# Patient Record
Sex: FEMALE | Race: OTHER | NOT HISPANIC OR LATINO | Employment: PART TIME
[De-identification: names, ages, dates, MRNs, and addresses within clinical notes are randomized per-mention and may not be internally consistent; named-entity substitution may affect disease eponyms.]

---

## 2022-08-31 ENCOUNTER — TELEMEDICINE (OUTPATIENT)
Dept: INTERNAL MEDICINE | Facility: OTHER | Age: 32
End: 2022-08-31
Payer: COMMERCIAL

## 2022-08-31 VITALS — BODY MASS INDEX: 17.73 KG/M2 | WEIGHT: 117 LBS | HEIGHT: 68 IN

## 2022-08-31 DIAGNOSIS — Z30.431 ENCOUNTER FOR MANAGEMENT OF INTRAUTERINE CONTRACEPTIVE DEVICE (IUD), UNSPECIFIED IUD MANAGEMENT TYPE: ICD-10-CM

## 2022-08-31 DIAGNOSIS — C50.911 INFILTRATING DUCTAL CARCINOMA OF RIGHT BREAST (HCC): ICD-10-CM

## 2022-08-31 DIAGNOSIS — F90.9 ATTENTION DEFICIT HYPERACTIVITY DISORDER (ADHD), UNSPECIFIED ADHD TYPE: ICD-10-CM

## 2022-08-31 DIAGNOSIS — G43.709 CHRONIC MIGRAINE WITHOUT AURA WITHOUT STATUS MIGRAINOSUS, NOT INTRACTABLE: ICD-10-CM

## 2022-08-31 PROBLEM — M20.11 ACQUIRED HALLUX VALGUS OF RIGHT FOOT: Status: ACTIVE | Noted: 2019-09-11

## 2022-08-31 PROBLEM — J34.2 DEVIATED NASAL SEPTUM: Status: ACTIVE | Noted: 2019-02-14

## 2022-08-31 PROBLEM — N39.0 RECURRENT UTI: Status: ACTIVE | Noted: 2018-02-08

## 2022-08-31 PROBLEM — G89.29 CHRONIC PAIN: Status: ACTIVE | Noted: 2021-04-02

## 2022-08-31 PROBLEM — M20.12 ACQUIRED HALLUX VALGUS OF LEFT FOOT: Status: ACTIVE | Noted: 2020-02-25

## 2022-08-31 PROBLEM — K51.50 CHRONIC LEFT-SIDED ULCERATIVE COLITIS (HCC): Status: ACTIVE | Noted: 2022-06-07

## 2022-08-31 PROBLEM — N76.1 CHRONIC VAGINITIS: Status: ACTIVE | Noted: 2018-01-17

## 2022-08-31 PROBLEM — F41.9 ANXIETY DISORDER: Status: ACTIVE | Noted: 2017-12-28

## 2022-08-31 PROBLEM — K51.90 ULCERATIVE COLITIS IN REMISSION (HCC): Status: ACTIVE | Noted: 2020-06-03

## 2022-08-31 PROBLEM — D50.9 IRON DEFICIENCY ANEMIA: Status: ACTIVE | Noted: 2017-12-29

## 2022-08-31 PROBLEM — G47.00 INSOMNIA: Status: ACTIVE | Noted: 2021-04-02

## 2022-08-31 PROBLEM — K52.9 INFLAMMATORY BOWEL DISEASE: Status: ACTIVE | Noted: 2021-03-29

## 2022-08-31 PROBLEM — N87.0 CERVICAL INTRAEPITHELIAL NEOPLASIA GRADE 1: Status: ACTIVE | Noted: 2017-05-03

## 2022-08-31 PROBLEM — C50.411 MALIGNANT NEOPLASM OF UPPER-OUTER QUADRANT OF RIGHT FEMALE BREAST (HCC): Status: ACTIVE | Noted: 2022-07-06

## 2022-08-31 PROBLEM — J34.89 NASAL SEPTAL PERFORATION: Status: ACTIVE | Noted: 2019-02-19

## 2022-08-31 PROBLEM — N20.0 RENAL CALCULUS: Status: ACTIVE | Noted: 2021-04-07

## 2022-08-31 PROBLEM — J30.9 ALLERGIC RHINITIS: Status: ACTIVE | Noted: 2019-02-19

## 2022-08-31 PROBLEM — F33.42 MAJOR DEPRESSIVE DISORDER, RECURRENT EPISODE, IN FULL REMISSION (HCC): Status: ACTIVE | Noted: 2019-02-14

## 2022-08-31 PROBLEM — R87.810 CERVICAL HIGH RISK HUMAN PAPILLOMAVIRUS (HPV) DNA TEST POSITIVE: Status: ACTIVE | Noted: 2017-05-03

## 2022-08-31 PROBLEM — G43.909 MIGRAINE: Status: ACTIVE | Noted: 2021-04-02

## 2022-08-31 PROCEDURE — 99203 OFFICE O/P NEW LOW 30 MIN: CPT | Mod: GC | Performed by: STUDENT IN AN ORGANIZED HEALTH CARE EDUCATION/TRAINING PROGRAM

## 2022-08-31 RX ORDER — ACETAMINOPHEN 500 MG
1-2 TABLET ORAL EVERY 6 HOURS PRN
COMMUNITY
Start: 2021-03-29 | End: 2023-03-29

## 2022-08-31 RX ORDER — DEXTROAMPHETAMINE SACCHARATE, AMPHETAMINE ASPARTATE MONOHYDRATE, DEXTROAMPHETAMINE SULFATE AND AMPHETAMINE SULFATE 5; 5; 5; 5 MG/1; MG/1; MG/1; MG/1
CAPSULE, EXTENDED RELEASE ORAL
COMMUNITY
Start: 2022-07-12 | End: 2023-01-11

## 2022-08-31 ASSESSMENT — PATIENT HEALTH QUESTIONNAIRE - PHQ9
2. FEELING DOWN, DEPRESSED, IRRITABLE, OR HOPELESS: NOT AT ALL
SUM OF ALL RESPONSES TO PHQ9 QUESTIONS 1 AND 2: 0
1. LITTLE INTEREST OR PLEASURE IN DOING THINGS: NOT AT ALL

## 2022-08-31 ASSESSMENT — FIBROSIS 4 INDEX: FIB4 SCORE: 0.77

## 2022-08-31 NOTE — PROGRESS NOTES
Reason to establish: New patient to establish    CC: to establish care and referral for mastectomy    This evaluation was conducted via Zoom using secure and encrypted videoconferencing technology. The patient was in their home in the state of [place].    The patient's identity was confirmed and verbal consent was obtained for this virtual visit.     HPI:   This is 31 yo female with a history of UC on mesalamine, ADHD on adderall (through psychiatry), invasive ductal carcinoma (ER positive, diagnosed in July 2022), migraine on sumatriptan prn who is presenting to establish care and get a referral for mastectomy.    Patient gets her care at [facility] in [place] due to her recently diagnosed breast cancer (invasive ductal carcinoma). She was diagnosed in July 2022.   Will get double mastectomy in a week tentatively according to the patient. Because [facility] is out of network, she needs an out of network referral for her mastectomy through [facility].  [facility] genetic testing results are pending according to the patient.  Sister also has breast cancer    Has had IUD since 8 months ago. Had implant in arm for 4-5 months before that. No contraception before that.  She is currently having period and on her third day. She had her period 10 days ago prior to this one.  Usually her period is regular, occurring every month and lasts 9 days. The last period lasted 14 days. Period amount is light. No change in the severity of cramps or back pain.  She has a history of frequent bruising and heavy bleeding from dental extraction. Otherwise, she did not have significant bleeding history. Her menstruation prior to contraception was never heavy.  She is already scheduled to see ob/gyn this week.    She denies depression or anxiety. She is on adderall for ADHD and her psychiatrist in [place] prescribed it for her according to the patient. PDMP in [place] was reviewed and was clear without prescription of  adderall.    Migraine rarely occurs about once a month. Associated with photophobia and phonophobia. Sumatriptan is good at breaking the migraine.    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 Lives in [place], [place]. Lives with her . Has IUD.   Declined STD screening.    No smoking, alcohol, drugs.    Last pap smear 2019, and was normal.  Last colonoscopy 2020        Patient Active Problem List    Diagnosis Date Noted    Malignant neoplasm of upper-outer quadrant of right female breast ([facility]) 07/06/2022    Chronic left-sided ulcerative colitis (HCC) 06/07/2022    Renal calculus 04/07/2021    Chronic pain 04/02/2021    Insomnia 04/02/2021    Migraine 04/02/2021    Inflammatory bowel disease 03/29/2021    ADHD 09/28/2020    Ulcerative colitis in remission ([facility]) 06/03/2020    Acquired hallux valgus of left foot 02/25/2020    Acquired hallux valgus of right foot 09/11/2019    Allergic rhinitis 02/19/2019    Nasal septal perforation 02/19/2019    Deviated nasal septum 02/14/2019    Major depressive disorder, recurrent episode, in full remission ([facility]) 02/14/2019    Recurrent UTI 02/08/2018    Chronic vaginitis 01/17/2018    Iron deficiency anemia 12/29/2017    Anxiety disorder 12/28/2017    Cervical high risk human papillomavirus (HPV) DNA test positive 05/03/2017    Cervical intraepithelial neoplasia grade 1 05/03/2017       History reviewed. No pertinent past medical history.    Current Outpatient Medications   Medication Sig Dispense Refill    acetaminophen (TYLENOL) 500 MG Tab Take 1-2 Tablets by mouth every 6 hours as needed.      amphetamine-dextroamphetamine (ADDERALL XR) 20 MG per XR capsule Take up to 2 capsules by mouth in the morning for ADHD. I look forward to talking to you in August      Mesalamine (LIALDA PO) Take  by mouth.       No current facility-administered medications for this visit.       Allergies as of 08/31/2022    (No Known Allergies)     No allergies    Social History     Socioeconomic History    Marital  status:      Spouse name: Not on file    Number of children: Not on file    Years of education: Not on file    Highest education level: Not on file   Occupational History    Not on file   Tobacco Use    Smoking status: Never    Smokeless tobacco: Never   Substance and Sexual Activity    Alcohol use: Never    Drug use: Never    Sexual activity: Not on file   Other Topics Concern    Not on file   Social History Narrative    Not on file     Social Determinants of Health     Financial Resource Strain: Not on file   Food Insecurity: Not on file   Transportation Needs: Not on file   Physical Activity: Not on file   Stress: Not on file   Social Connections: Not on file   Intimate Partner Violence: Not on file   Housing Stability: Not on file       History reviewed. No pertinent family history.  FH  Dad-stroke, dementia, diabetes, dyslipidemia, HTN, bypass surgery  Mom-heart attack 38  Paternal uncle- chron's  Paternal cousin-ulcerative colitis     History reviewed. No pertinent surgical history.    PSH  2 buniectomy-R and Left  Rhinoplasty      Allergies  None    ROS: As per HPI. Additional pertinent systems as noted below.  Constitutional: Negative for chills and fever.   HENT: Negative for ear pain and sore throat.    Eyes: Negative for discharge and redness.   Respiratory: Negative for cough, hemoptysis, wheezing and stridor.    Cardiovascular: Negative for chest pain, palpitations and leg swelling.   Gastrointestinal: Negative for abdominal pain, constipation, diarrhea, heartburn, nausea and vomiting.   Genitourinary: Negative for dysuria, flank pain and hematuria.   Musculoskeletal: Negative for falls and myalgias.   Skin: Negative for itching and rash.   Neurological: Negative for dizziness, seizures, loss of consciousness and headaches.   Endo/Heme/Allergies: Negative for polydipsia. Does not bruise/bleed easily.   Psychiatric/Behavioral: Negative for substance abuse and suicidal ideas.       Ht 1.715 m (5'  "7.5\")   Wt 53.1 kg (117 lb)   BMI 18.05 kg/m²     Physical exam limited due to telemedicine.    General: No acute distress.   HEENT: EOM grossly intact, no scleral icterus, no pharyngeal erythema.   Neck:  nontender on patient palpation  CVS: no chest pain, palpitation  Resp: breathing is nonlabored  Abdomen: nontender on patient palpation.  MSK/Ext: No clubbing or cyanosis.  Skin: no rash on face  Neurological: CN III-XII grossly intact. No focal deficits.   Psych: A&O x 3, appropriate affect, good judgement   Note: I have reviewed all pertinent labs and diagnostic tests associated with this visit with specific comments listed under the assessment and plan below    Assessment and Plan  1. Infiltrating ductal carcinoma of right breast (HCC)  Patient gets her care at [facility] in [place] due to her recently diagnosed breast cancer (invasive ductal carcinoma). She was diagnosed in July 2022.   Will get double mastectomy in a week tentatively according to the patient. Because [facility] is out of network, she needs an out of network referral for her mastectomy through [facility].  [facility] genetic testing results are pending according to the patient.  Sister also has breast cancer    Plan:  - Referral to General Surgery    2. Encounter for management of intrauterine contraceptive device (IUD), unspecified IUD management type  Has had IUD since 8 months ago. Had implant in arm for 4-5 months before that. No contraception before that.  She is currently having period and on her third day. She had her period 10 days ago prior to this one.  Usually her period is regular, occurring every month and lasts 9 days. The last period lasted 14 days. Period amount is light. No change in the severity of cramps or back pain.  She has a history of frequent bruising and heavy bleeding from dental extraction. Otherwise, she did not have significant bleeding history. Her menstruation prior to contraception was never heavy.  She " is already scheduled to see ob/gyn this week.    Plan:  Follow up on menstruation after ob/gyn visit    3. Chronic migraine without aura without status migrainosus, not intractable  Migraine rarely occurs about once a month. Associated with photophobia and phonophobia. Sumatriptan is good at breaking the migraine.    Plan:  Sumatriptan prn for migraine    4. ADHD  She denies depression or anxiety. She is on adderall for ADHD and her psychiatrist in [place] prescribed it for her according to the patient. PDMP in [place] was reviewed and was clear without prescription of adderall.    Plan:  -Referral to psychiatry so that patient can continue to get adderall in [place] through psychiatry.    Followup: Return in about 1 month (around 9/30/2022).    Signed by: Cruz Wisdom M.D.

## 2022-08-31 NOTE — PATIENT INSTRUCTIONS
Addended Callie Vogel on: 9/25/2019 09:58 AM     Modules accepted: Trav Follow up in 1-2 months.  -placed referral to surgery at [facility]  -placed referral to psychiatry

## 2022-09-02 PROBLEM — N87.0 CERVICAL INTRAEPITHELIAL NEOPLASIA GRADE 1: Status: RESOLVED | Noted: 2017-05-03 | Resolved: 2022-09-02

## 2022-09-02 PROBLEM — F33.42 MAJOR DEPRESSIVE DISORDER, RECURRENT EPISODE, IN FULL REMISSION (HCC): Status: RESOLVED | Noted: 2019-02-14 | Resolved: 2022-09-02

## 2022-09-02 PROBLEM — F41.9 ANXIETY DISORDER: Status: RESOLVED | Noted: 2017-12-28 | Resolved: 2022-09-02

## 2022-09-02 PROBLEM — G47.00 INSOMNIA: Status: RESOLVED | Noted: 2021-04-02 | Resolved: 2022-09-02

## 2022-09-02 PROBLEM — J34.2 DEVIATED NASAL SEPTUM: Status: RESOLVED | Noted: 2019-02-14 | Resolved: 2022-09-02

## 2022-09-02 PROBLEM — G89.29 CHRONIC PAIN: Status: RESOLVED | Noted: 2021-04-02 | Resolved: 2022-09-02

## 2022-09-02 PROBLEM — J30.9 ALLERGIC RHINITIS: Status: RESOLVED | Noted: 2019-02-19 | Resolved: 2022-09-02

## 2022-09-02 PROBLEM — J34.89 NASAL SEPTAL PERFORATION: Status: RESOLVED | Noted: 2019-02-19 | Resolved: 2022-09-02

## 2022-09-02 PROBLEM — M20.12 ACQUIRED HALLUX VALGUS OF LEFT FOOT: Status: RESOLVED | Noted: 2020-02-25 | Resolved: 2022-09-02

## 2022-09-02 PROBLEM — N39.0 RECURRENT UTI: Status: RESOLVED | Noted: 2018-02-08 | Resolved: 2022-09-02

## 2022-09-02 PROBLEM — D50.9 IRON DEFICIENCY ANEMIA: Status: RESOLVED | Noted: 2017-12-29 | Resolved: 2022-09-02

## 2022-09-02 PROBLEM — R87.810 CERVICAL HIGH RISK HUMAN PAPILLOMAVIRUS (HPV) DNA TEST POSITIVE: Status: RESOLVED | Noted: 2017-05-03 | Resolved: 2022-09-02

## 2022-09-02 PROBLEM — N76.1 CHRONIC VAGINITIS: Status: RESOLVED | Noted: 2018-01-17 | Resolved: 2022-09-02

## 2022-09-02 PROBLEM — M20.11 ACQUIRED HALLUX VALGUS OF RIGHT FOOT: Status: RESOLVED | Noted: 2019-09-11 | Resolved: 2022-09-02

## 2022-09-02 PROBLEM — Z30.9 CONTRACEPTION MANAGEMENT: Status: ACTIVE | Noted: 2022-09-02

## 2022-09-02 RX ORDER — COPPER 313.4 MG/1
1 INTRAUTERINE DEVICE INTRAUTERINE
COMMUNITY
Start: 2021-07-01 | End: 2023-03-21

## 2022-09-02 ASSESSMENT — PATIENT HEALTH QUESTIONNAIRE - PHQ9
4. FEELING TIRED OR HAVING LITTLE ENERGY: NOT AT ALL
7. TROUBLE CONCENTRATING ON THINGS, SUCH AS READING THE NEWSPAPER OR WATCHING TELEVISION: NOT AT ALL
5. POOR APPETITE OR OVEREATING: NOT AT ALL
9. THOUGHTS THAT YOU WOULD BE BETTER OFF DEAD, OR OF HURTING YOURSELF: NOT AT ALL
1. LITTLE INTEREST OR PLEASURE IN DOING THINGS: NOT AT ALL
2. FEELING DOWN, DEPRESSED, IRRITABLE, OR HOPELESS: NOT AT ALL
6. FEELING BAD ABOUT YOURSELF - OR THAT YOU ARE A FAILURE OR HAVE LET YOURSELF OR YOUR FAMILY DOWN: NOT AL ALL
SUM OF ALL RESPONSES TO PHQ9 QUESTIONS 1 AND 2: 0
SUM OF ALL RESPONSES TO PHQ QUESTIONS 1-9: 0
3. TROUBLE FALLING OR STAYING ASLEEP OR SLEEPING TOO MUCH: NOT AT ALL
8. MOVING OR SPEAKING SO SLOWLY THAT OTHER PEOPLE COULD HAVE NOTICED. OR THE OPPOSITE, BEING SO FIGETY OR RESTLESS THAT YOU HAVE BEEN MOVING AROUND A LOT MORE THAN USUAL: NOT AT ALL

## 2022-09-06 ENCOUNTER — APPOINTMENT (OUTPATIENT)
Dept: INTERNAL MEDICINE | Facility: OTHER | Age: 32
End: 2022-09-06
Payer: COMMERCIAL

## 2022-10-11 ENCOUNTER — TELEPHONE (OUTPATIENT)
Dept: INTERNAL MEDICINE | Facility: OTHER | Age: 32
End: 2022-10-11
Payer: COMMERCIAL

## 2022-10-11 DIAGNOSIS — C50.911 INFILTRATING DUCTAL CARCINOMA OF RIGHT BREAST (HCC): ICD-10-CM

## 2022-10-11 NOTE — TELEPHONE ENCOUNTER
Patient called asking for 3  referral, the first one is to Dr esther REESE- Oncologist     The second one is to Dr Zoraida Hernández-Gynecologist     The third one is to Dr Donnell Hobbs - GI Haematologist     Per patient her insurance requires 3 different referrals for each provider.

## 2022-11-03 ENCOUNTER — PATIENT MESSAGE (OUTPATIENT)
Dept: HEALTH INFORMATION MANAGEMENT | Facility: OTHER | Age: 32
End: 2022-11-03

## 2022-11-21 ENCOUNTER — APPOINTMENT (OUTPATIENT)
Dept: HEMATOLOGY ONCOLOGY | Facility: MEDICAL CENTER | Age: 32
End: 2022-11-21
Payer: COMMERCIAL

## 2022-12-21 ENCOUNTER — TELEPHONE (OUTPATIENT)
Dept: HEMATOLOGY ONCOLOGY | Facility: MEDICAL CENTER | Age: 32
End: 2022-12-21
Payer: COMMERCIAL

## 2022-12-30 ENCOUNTER — HOSPITAL ENCOUNTER (OUTPATIENT)
Dept: HEMATOLOGY ONCOLOGY | Facility: MEDICAL CENTER | Age: 32
End: 2022-12-30
Attending: INTERNAL MEDICINE
Payer: COMMERCIAL

## 2022-12-30 VITALS
HEART RATE: 102 BPM | WEIGHT: 120.81 LBS | DIASTOLIC BLOOD PRESSURE: 72 MMHG | RESPIRATION RATE: 16 BRPM | OXYGEN SATURATION: 98 % | SYSTOLIC BLOOD PRESSURE: 118 MMHG | TEMPERATURE: 97.7 F | BODY MASS INDEX: 18.31 KG/M2 | HEIGHT: 68 IN

## 2022-12-30 DIAGNOSIS — C50.411 MALIGNANT NEOPLASM OF UPPER-OUTER QUADRANT OF RIGHT BREAST IN FEMALE, ESTROGEN RECEPTOR POSITIVE (HCC): ICD-10-CM

## 2022-12-30 DIAGNOSIS — Z17.0 MALIGNANT NEOPLASM OF UPPER-OUTER QUADRANT OF RIGHT BREAST IN FEMALE, ESTROGEN RECEPTOR POSITIVE (HCC): ICD-10-CM

## 2022-12-30 PROCEDURE — 99205 OFFICE O/P NEW HI 60 MIN: CPT | Performed by: INTERNAL MEDICINE

## 2022-12-30 RX ORDER — SIMETHICONE 80 MG
TABLET,CHEWABLE ORAL
COMMUNITY
Start: 2022-12-01 | End: 2023-12-01

## 2022-12-30 RX ORDER — MENOTROPINS 75 UNIT
KIT SUBCUTANEOUS
COMMUNITY
End: 2024-02-28

## 2022-12-30 RX ORDER — LETROZOLE 2.5 MG/1
5 TABLET, FILM COATED ORAL DAILY
COMMUNITY
Start: 2022-12-15 | End: 2024-02-28

## 2022-12-30 RX ORDER — ONDANSETRON 8 MG/1
8 TABLET, ORALLY DISINTEGRATING ORAL
COMMUNITY
Start: 2022-11-08 | End: 2023-02-16 | Stop reason: SDUPTHER

## 2022-12-30 RX ORDER — ASPIRIN 81 MG/1
81 TABLET, CHEWABLE ORAL DAILY
COMMUNITY
End: 2024-02-28

## 2022-12-30 RX ORDER — HYDROCODONE BITARTRATE AND ACETAMINOPHEN 5; 325 MG/1; MG/1
1 TABLET ORAL 3 TIMES DAILY
COMMUNITY
Start: 2022-12-19

## 2022-12-30 ASSESSMENT — FIBROSIS 4 INDEX: FIB4 SCORE: 0.77

## 2022-12-30 ASSESSMENT — PAIN SCALES - GENERAL: PAINLEVEL: NO PAIN

## 2022-12-30 NOTE — PROGRESS NOTES
Consult:  Hematology/Oncology      Referring Physician: Cruz Wisdom M.D.  Primary Care:  Cruz Wisdom M.D.    Diagnosis:     Chief Complaint:      History of Presenting Illness:  Bekah Ronquillo is a 32 y.o. female who had an abnormal mammogram in June 2022.  She underwent biopsies of the right breast at multiple locations on 7/6/2022 in [place].  Pathology was reviewed in [facility] and showed invasive ductal carcinoma intermediate grade in 3 distinct lesions at 11:00 3 4 and 8 cm from the nipple.  They were all ER positive greater than 90% NE low +5 to 12% and HER2 negative 1+ by IHC.  GATA3 was positive as well.  She was seen at [facility] and elected to undergo bilateral mastectomies.  It should be noted that her sister was diagnosed at the same age that she was with advanced breast cancer.  At surgery Bekah  was found to have 4 foci of invasive carcinoma of which 2 were read as invasive neuroendocrine tumors with the largest at 14 mm and 12 mm.  2 additional lesions were read as invasive ductal carcinoma with focal neuroendocrine differentiation.  All 4 foci were diffusely positive for synaptic ficin and all were negative for chromogranin.  Ki-67 ranged from 15.8% to 42.7%.1 of 3 sentinel lymph nodes showed isolated tumor cells measuring 0.12 mm in greatest dimension without extranodal extension.  The left breast showed focal proliferative fibrocystic change and no evidence of malignancy.  She had immediate tissue expander reconstruction.  She was seen by Dr. De La [facility] medical oncology who presented her case at [facility] breast conference.  The consensus was treatment with chemotherapy with AC/T followed by ovarian function suppression and an AI.  From [place] and elected to come back to get her treatment.  She is currently in the midst of fertility treatments including fletrozole with plan for embryo harvest in early January.  Of note she has a longstanding history of ulcerative colitis  which has been fairly symptomatic recently.  She is using anti-inflammatory oral and topical agents only and has not been on disease modifying agents like Humira in the past.  She does have chronic pain and will be seeing the pain specialist for this.  She is premenopausal.  Multigene panel testing was negative hereditary cancer syndromes except for a VUS in BRCA2.      Past Medical History:   Diagnosis Date    Acquired hallux valgus of left foot 2/25/2020    Formatting of this note might be different from the original. Added automatically from request for surgery [id number]    Acquired hallux valgus of right foot 9/11/2019    Formatting of this note might be different from the original. Added automatically from request for surgery [id number]    Allergic rhinitis 2/19/2019    Anxiety disorder 12/28/2017    Cervical high risk human papillomavirus (HPV) DNA test positive 5/3/2017    Cervical intraepithelial neoplasia grade 1 5/3/2017    Formatting of this note might be different from the original. 04/2016 pap lgsil, hpv positive at [place]. Records in care everywhere. Scheduled for colposcopy later this month of 05/2017 8/21/2017 colposcopy done. No biopsies taken since pregnant. No abnormalities noted.  12/2017: ASCUS, HPV+ 5/17/2018 CIN1 6/2019-+ HPV, to schedule colpo 9/27/2019 colpo negative    Chronic pain 4/2/2021    Chronic vaginitis 1/17/2018    Deviated nasal septum 2/14/2019    Deviated nasal septum 2/14/2019    Insomnia 4/2/2021    Iron deficiency anemia 12/29/2017    Major depressive disorder, recurrent episode, in full remission (HCC) 2/14/2019    Nasal septal perforation 2/19/2019    Recurrent UTI 2/8/2018       History reviewed. No pertinent surgical history.    Social History     Tobacco Use    Smoking status: Never    Smokeless tobacco: Never   Vaping Use    Vaping Use: Never used   Substance Use Topics    Alcohol use: Never    Drug use: Never        Family History   Problem Relation Age of Onset    Heart  "Disease Mother     Hypertension Father     Hyperlipidemia Father     Diabetes Father     Stroke Father     Dementia Father        Allergies as of 12/30/2022    (No Known Allergies)         Current Outpatient Medications:     HYDROcodone/acetaminophen (NORCO)  MG Tab, Take 1 Tablet by mouth 3 times a day., Disp: , Rfl:     letrozole (FEMARA) 2.5 MG Tab, Take 5 mg by mouth every day., Disp: , Rfl:     ondansetron (ZOFRAN ODT) 8 MG TABLET DISPERSIBLE, Take 8 mg by mouth., Disp: , Rfl:     simethicone (MYLICON) 80 MG Chew Tab, Please take as directed in GI direction., Disp: , Rfl:     aspirin (ASA) 81 MG Chew Tab chewable tablet, Chew 81 mg every day., Disp: , Rfl:     Follitropin Eugene (GONAL-F RFF PEN SC), Inject 225 mg under the skin. AM, Disp: , Rfl:     Menotropins (MENOPUR) 75 UNIT Recon Soln, Inject  under the skin., Disp: , Rfl:     Paragard Intrauterine Copper IUD, 1 Each by Intrauterine route., Disp: , Rfl:     acetaminophen (TYLENOL) 500 MG Tab, Take 1-2 Tablets by mouth every 6 hours as needed., Disp: , Rfl:     amphetamine-dextroamphetamine (ADDERALL XR) 20 MG per XR capsule, Take up to 2 capsules by mouth in the morning for ADHD. I look forward to talking to you in August, Disp: , Rfl:     Mesalamine (LIALDA PO), Take  by mouth., Disp: , Rfl:     Review of Systems:  ROS       Physical Exam:  Vitals:    12/30/22 1506   BP: 118/72   BP Location: Left arm   Patient Position: Sitting   BP Cuff Size: Adult   Pulse: (!) 102   Resp: 16   Temp: 36.5 °C (97.7 °F)   TempSrc: Temporal   SpO2: 98%   Weight: 54.8 kg (120 lb 13 oz)   Height: 1.715 m (5' 7.52\")       DESC; KARNOFSKY SCALE WITH ECOG EQUIVALENT: 80, Normal activity with effort; some signs or symptoms of disease (ECOG equivalent 1)    DISTRESS LEVEL: moderate distress    Physical Exam     Exam not performed  Labs:  No visits with results within 1 Month(s) from this visit.   Latest known visit with results is:   No results found for any previous visit. "       Imaging:   All listed images below have been independently reviewed by me. I agree with the findings as summarized below:    No results found.     Pathology:      Assessment & Plan:    1.  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  Is a good candidate for adjuvant chemotherapy given her age and may be a candidate for postmastectomy radiation.  She would clearly benefit from ovarian function suppression and aromatase inhibition after successful embryo retrieval.  2.  Ulcerative colitis symptomatic  3.  Strong family history of breast cancer with personal negative multigene panel ( VUS BRCA2 by report)    Plan: I spent an hour with her going over her complex situation.  She was initially very reluctant to entertain chemotherapy under any circumstance because of the concerns of affecting her underlying autoimmune disease.  She is agreeable to OFS plus endocrine treatment for a period of at least 2 years and optimally 5 years before considering using retrieved embryo for pregnancy.  She is interested in receiving TC chemotherapy after I explained the immunosuppressive effects of cyclophosphamide and the fact that in my experience most patients with autoimmune diseases temporarily get better during chemotherapy without long-lasting negative effects on their underlying autoimmune process.  She would also be interested in cold capping to keep her hair.  The benefit of an anthracycline containing regimen in her situation is unclear given her size of the lesions and the isolated tumor cell sonly in the lymph node.  She and her  will discuss further and we will see her back in 3 weeks after completion of embryo retrieval for further discussion.  I will also refer her to radiation therapy for discussion.        Any  questions and concerns raised by the patient were answered to the best of my ability. Thank you for allowing me to participate in the care for this patient. Please feel free to contact me for any questions or concerns.     Oskar Qureshi M.D.

## 2023-01-12 ENCOUNTER — PATIENT OUTREACH (OUTPATIENT)
Dept: OTHER | Facility: MEDICAL CENTER | Age: 33
End: 2023-01-12
Payer: COMMERCIAL

## 2023-01-12 ENCOUNTER — APPOINTMENT (OUTPATIENT)
Dept: RADIATION ONCOLOGY | Facility: MEDICAL CENTER | Age: 33
End: 2023-01-12
Attending: RADIOLOGY
Payer: COMMERCIAL

## 2023-01-12 NOTE — PROGRESS NOTES
Oncology Nurse Navigation   Phoned pt for follow up.  Pt established with Dr. Qureshi and is scheduled for radiation consult with Dr. Roche.  Reviewed role of Oncology Nurse Navigator and provided contact information.  Pt states she is trying to establish with a pain specialist here in [place] but is having a very difficult time securing an appointment.  Pt states she has left several messages and has not heard back.  She states she will continue to try to reach the office but may need assistance in facilitating appointment.    Intro letter sent via My Chart.

## 2023-01-18 ENCOUNTER — HOSPITAL ENCOUNTER (OUTPATIENT)
Dept: RADIOLOGY | Facility: MEDICAL CENTER | Age: 33
End: 2023-01-18

## 2023-01-18 ENCOUNTER — HOSPITAL ENCOUNTER (OUTPATIENT)
Dept: HEMATOLOGY ONCOLOGY | Facility: MEDICAL CENTER | Age: 33
End: 2023-01-18
Attending: INTERNAL MEDICINE
Payer: COMMERCIAL

## 2023-01-18 VITALS
WEIGHT: 125.66 LBS | OXYGEN SATURATION: 100 % | BODY MASS INDEX: 19.72 KG/M2 | RESPIRATION RATE: 17 BRPM | TEMPERATURE: 98.2 F | HEART RATE: 82 BPM | HEIGHT: 67 IN | DIASTOLIC BLOOD PRESSURE: 67 MMHG | SYSTOLIC BLOOD PRESSURE: 95 MMHG

## 2023-01-18 DIAGNOSIS — C50.411 MALIGNANT NEOPLASM OF UPPER-OUTER QUADRANT OF RIGHT BREAST IN FEMALE, ESTROGEN RECEPTOR POSITIVE (HCC): ICD-10-CM

## 2023-01-18 DIAGNOSIS — Z17.0 MALIGNANT NEOPLASM OF UPPER-OUTER QUADRANT OF RIGHT BREAST IN FEMALE, ESTROGEN RECEPTOR POSITIVE (HCC): ICD-10-CM

## 2023-01-18 PROCEDURE — 99214 OFFICE O/P EST MOD 30 MIN: CPT | Performed by: INTERNAL MEDICINE

## 2023-01-18 PROCEDURE — 99212 OFFICE O/P EST SF 10 MIN: CPT | Performed by: INTERNAL MEDICINE

## 2023-01-18 ASSESSMENT — ENCOUNTER SYMPTOMS
MUSCULOSKELETAL NEGATIVE: 1
NEUROLOGICAL NEGATIVE: 1
PSYCHIATRIC NEGATIVE: 1
RESPIRATORY NEGATIVE: 1
CARDIOVASCULAR NEGATIVE: 1
EYES NEGATIVE: 1
GASTROINTESTINAL NEGATIVE: 1
CONSTITUTIONAL NEGATIVE: 1

## 2023-01-18 ASSESSMENT — PAIN SCALES - GENERAL: PAINLEVEL: 6=MODERATE PAIN

## 2023-01-18 ASSESSMENT — FIBROSIS 4 INDEX: FIB4 SCORE: 0.77

## 2023-01-18 NOTE — PROGRESS NOTES
Consult:  Hematology/Oncology      Referring Physician: Cruz Wisdom M.D.  Primary Care:  Cruz Wisdom M.D.    Diagnosis: Right breast cancer with neuroendocrine differentiation    Chief Complaint:  Right breast cancer with neuroendocrine differentiation    History of Presenting Illness:  Bekah Ronquillo is a 32 y.o. female who had an abnormal mammogram in June 2022.  She underwent biopsies of the right breast at multiple locations on 7/6/2022 in [place].  Pathology was reviewed in [facility] cancer [facility] and showed invasive ductal carcinoma intermediate grade in 3 distinct lesions at 11:00 3 4 and 8 cm from the nipple.  They were all ER positive greater than 90% MN low +5 to 12% and HER2 negative 1+ by IHC.  GATA3 was positive as well.  She was seen at [facility] and elected to undergo bilateral mastectomies.  It should be noted that her sister was diagnosed at the same age that she was with advanced breast cancer.  At surgery Bekah  was found to have 4 foci of invasive carcinoma of which 2 were read as invasive neuroendocrine tumors with the largest at 14 mm and 12 mm.  2 additional lesions were read as invasive ductal carcinoma with focal neuroendocrine differentiation.  All 4 foci were diffusely positive for synaptic ficin and all were negative for chromogranin.  Ki-67 ranged from 15.8% to 42.7%.1 of 3 sentinel lymph nodes showed isolated tumor cells measuring 0.12 mm in greatest dimension without extranodal extension.  The left breast showed focal proliferative fibrocystic change and no evidence of malignancy.  She had immediate tissue expander reconstruction.  She was seen by Dr. De La Cruz medical oncology who presented her case at [facility] breast conference.  The consensus was treatment with chemotherapy with AC/T followed by ovarian function suppression and an AI.  From [place] and elected to come back to get her treatment.  She is currently in the midst of fertility treatments including fletrozole  with plan for embryo harvest in early January.  Of note she has a longstanding history of ulcerative colitis which has been fairly symptomatic recently.  She is using anti-inflammatory oral and topical agents only and has not been on disease modifying agents like Humira in the past.  She does have chronic pain and will be seeing the pain specialist for this.  She is premenopausal.  Multigene panel testing was negative hereditary cancer syndromes except for a VUS in BRCA2.    Interval history 1/18/2023: She completed embryo retrieval and was able to get 5 embryos that are successfully harvested and frozen.  She is feeling well after going through the treatments.  After long discussion she has decided not to pursue chemotherapy.  She is going to see radiation therapy next week.  She is willing to begin endocrine therapy and ovarian function suppression.  We discussed the results indicating safety for discontinuing endocrine therapy temporarily after at least 2 years of treatment to get pregnant and then resuming subsequently.  We will likely follow this plan.      Past Medical History:   Diagnosis Date    Acquired hallux valgus of left foot 2/25/2020    Formatting of this note might be different from the original. Added automatically from request for surgery [id number]    Acquired hallux valgus of right foot 9/11/2019    Formatting of this note might be different from the original. Added automatically from request for surgery [id number]    Allergic rhinitis 2/19/2019    Anxiety disorder 12/28/2017    Cervical high risk human papillomavirus (HPV) DNA test positive 5/3/2017    Cervical intraepithelial neoplasia grade 1 5/3/2017    Formatting of this note might be different from the original. 04/2016 pap lgsil, hpv positive at [place]. Records in care everywhere. Scheduled for colposcopy later this month of 05/2017 8/21/2017 colposcopy done. No biopsies taken since pregnant. No abnormalities noted.  12/2017: ASCUS, HPV+  5/17/2018 CIN1 6/2019-+ HPV, to schedule colpo 9/27/2019 colpo negative    Chronic pain 4/2/2021    Chronic vaginitis 1/17/2018    Deviated nasal septum 2/14/2019    Deviated nasal septum 2/14/2019    Insomnia 4/2/2021    Iron deficiency anemia 12/29/2017    Major depressive disorder, recurrent episode, in full remission (HCC) 2/14/2019    Nasal septal perforation 2/19/2019    Recurrent UTI 2/8/2018       History reviewed. No pertinent surgical history.    Social History     Tobacco Use    Smoking status: Never    Smokeless tobacco: Never   Vaping Use    Vaping Use: Never used   Substance Use Topics    Alcohol use: Never    Drug use: Never        Family History   Problem Relation Age of Onset    Heart Disease Mother     Hypertension Father     Hyperlipidemia Father     Diabetes Father     Stroke Father     Dementia Father        Allergies as of 01/18/2023    (No Known Allergies)         Current Outpatient Medications:     HYDROcodone/acetaminophen (NORCO)  MG Tab, Take 1 Tablet by mouth 3 times a day., Disp: , Rfl:     letrozole (FEMARA) 2.5 MG Tab, Take 5 mg by mouth every day., Disp: , Rfl:     ondansetron (ZOFRAN ODT) 8 MG TABLET DISPERSIBLE, Take 8 mg by mouth., Disp: , Rfl:     simethicone (MYLICON) 80 MG Chew Tab, Please take as directed in GI direction., Disp: , Rfl:     aspirin (ASA) 81 MG Chew Tab chewable tablet, Chew 81 mg every day., Disp: , Rfl:     Follitropin Eugene (GONAL-F RFF PEN SC), Inject 225 mg under the skin. AM, Disp: , Rfl:     Menotropins (MENOPUR) 75 UNIT Recon Soln, Inject  under the skin., Disp: , Rfl:     Paragard Intrauterine Copper IUD, 1 Each by Intrauterine route., Disp: , Rfl:     acetaminophen (TYLENOL) 500 MG Tab, Take 1-2 Tablets by mouth every 6 hours as needed., Disp: , Rfl:     Mesalamine (LIALDA PO), Take  by mouth., Disp: , Rfl:     Review of Systems:  Review of Systems   Constitutional: Negative.    HENT: Negative.     Eyes: Negative.    Respiratory: Negative.    "  Cardiovascular: Negative.    Gastrointestinal: Negative.    Genitourinary: Negative.    Musculoskeletal: Negative.    Skin: Negative.    Neurological: Negative.    Endo/Heme/Allergies: Negative.    Psychiatric/Behavioral: Negative.          Physical Exam:  Vitals:    01/18/23 1451   BP: 95/67   Pulse: 82   Resp: 17   Temp: 36.8 °C (98.2 °F)   TempSrc: Temporal   SpO2: 100%   Weight: 57 kg (125 lb 10.6 oz)   Height: 1.702 m (5' 7\")       DESC; KARNOFSKY SCALE WITH ECOG EQUIVALENT: 80, Normal activity with effort; some signs or symptoms of disease (ECOG equivalent 1)    DISTRESS LEVEL: moderate distress    Physical Exam     Exam not performed  Labs:  No visits with results within 1 Month(s) from this visit.   Latest known visit with results is:   No results found for any previous visit.       Imaging:   All listed images below have been independently reviewed by me. I agree with the findings as summarized below:    No results found.     Pathology:      Assessment & Plan:    1.  Multifocal invasive ductal carcinoma with neuroendocrine differentiation largest lesion 14 mm, all grade 2 stage I (pT1c, pT1c, pT1b, pT1a, PN0 (I+)) ER positive greater than 90%, LA low +5 to 20%, HER2 negative, Ki-67 ranging from 15 to 45%.  Is a good candidate for adjuvant chemotherapy given her age and may be a candidate for postmastectomy radiation.  She would clearly benefit from ovarian function suppression and aromatase inhibition after successful embryo retrieval.  Declines chemotherapy will initiate ovarian function suppression  2.  Ulcerative colitis symptomatic  3.  Strong family history of breast cancer with personal negative multigene panel ( VUS BRCA2 by report)    Plan: We will initiate Lupron 3.75 mg monthly.  I will see her back in 4 weeks and recheck her estradiol level to confirm ovarian suppression.  If so we will initiate aromatase inhibition.  If not suppressed into the postmenopausal range will add tamoxifen.      Any " questions and concerns raised by the patient were answered to the best of my ability. Thank you for allowing me to participate in the care for this patient. Please feel free to contact me for any questions or concerns.     Oskar Qureshi M.D.

## 2023-01-19 NOTE — ADDENDUM NOTE
Encounter addended by: Keyana Alamo, Med Ass't on: 1/18/2023 4:59 PM   Actions taken: Charge Capture section accepted

## 2023-01-30 ENCOUNTER — APPOINTMENT (OUTPATIENT)
Dept: ONCOLOGY | Facility: MEDICAL CENTER | Age: 33
End: 2023-01-30
Attending: INTERNAL MEDICINE
Payer: COMMERCIAL

## 2023-01-30 ENCOUNTER — TELEPHONE (OUTPATIENT)
Dept: ONCOLOGY | Facility: MEDICAL CENTER | Age: 33
End: 2023-01-30
Payer: COMMERCIAL

## 2023-01-31 NOTE — TELEPHONE ENCOUNTER
Message left at telephone number regarding missed appointment. Pt to call scheduling to reschedule.

## 2023-02-09 ENCOUNTER — OUTPATIENT INFUSION SERVICES (OUTPATIENT)
Dept: ONCOLOGY | Facility: MEDICAL CENTER | Age: 33
End: 2023-02-09
Attending: INTERNAL MEDICINE
Payer: COMMERCIAL

## 2023-02-09 VITALS
HEART RATE: 115 BPM | BODY MASS INDEX: 19.49 KG/M2 | RESPIRATION RATE: 16 BRPM | DIASTOLIC BLOOD PRESSURE: 76 MMHG | WEIGHT: 121.25 LBS | TEMPERATURE: 97.5 F | HEIGHT: 66 IN | OXYGEN SATURATION: 99 % | SYSTOLIC BLOOD PRESSURE: 113 MMHG

## 2023-02-09 DIAGNOSIS — Z17.0 MALIGNANT NEOPLASM OF UPPER-OUTER QUADRANT OF RIGHT BREAST IN FEMALE, ESTROGEN RECEPTOR POSITIVE (HCC): ICD-10-CM

## 2023-02-09 DIAGNOSIS — C50.411 MALIGNANT NEOPLASM OF UPPER-OUTER QUADRANT OF RIGHT BREAST IN FEMALE, ESTROGEN RECEPTOR POSITIVE (HCC): ICD-10-CM

## 2023-02-09 LAB — HCG UR QL: NEGATIVE

## 2023-02-09 PROCEDURE — 81025 URINE PREGNANCY TEST: CPT

## 2023-02-09 PROCEDURE — 96402 CHEMO HORMON ANTINEOPL SQ/IM: CPT

## 2023-02-09 PROCEDURE — 700111 HCHG RX REV CODE 636 W/ 250 OVERRIDE (IP): Performed by: INTERNAL MEDICINE

## 2023-02-09 RX ADMIN — LEUPROLIDE ACETATE 3.75 MG: KIT at 16:14

## 2023-02-09 ASSESSMENT — FIBROSIS 4 INDEX: FIB4 SCORE: 0.79

## 2023-02-09 NOTE — PROGRESS NOTES
Pt in clinic for lupron, urine collected for HCG UA.  Pt's main complaint is insomnia, is trying melatonin, metrazine from her psychiatrist DR Magana, is waiting until the 15th of feb to see Dr Villalta what else he suggests.  Education provided about Lupron, pregnancy test negative.  Lupron administered in L dorsogluteal deep IM, pt left clinic stable.

## 2023-02-15 ENCOUNTER — TELEPHONE (OUTPATIENT)
Dept: HEMATOLOGY ONCOLOGY | Facility: MEDICAL CENTER | Age: 33
End: 2023-02-15
Payer: COMMERCIAL

## 2023-02-15 ENCOUNTER — APPOINTMENT (OUTPATIENT)
Dept: HEMATOLOGY ONCOLOGY | Facility: MEDICAL CENTER | Age: 33
End: 2023-02-15
Payer: COMMERCIAL

## 2023-02-15 NOTE — TELEPHONE ENCOUNTER
Patient calling to cancel/reschedule today's appointment, as she is not feeling well.     Appointment rescheduled to Dr. Qureshi on 3/8/2023.

## 2023-02-16 DIAGNOSIS — Z17.0 MALIGNANT NEOPLASM OF UPPER-OUTER QUADRANT OF RIGHT BREAST IN FEMALE, ESTROGEN RECEPTOR POSITIVE (HCC): ICD-10-CM

## 2023-02-16 DIAGNOSIS — C50.411 MALIGNANT NEOPLASM OF UPPER-OUTER QUADRANT OF RIGHT BREAST IN FEMALE, ESTROGEN RECEPTOR POSITIVE (HCC): ICD-10-CM

## 2023-02-16 RX ORDER — ONDANSETRON 8 MG/1
8 TABLET, ORALLY DISINTEGRATING ORAL EVERY 8 HOURS PRN
Qty: 15 TABLET | Refills: 3 | Status: SHIPPED | OUTPATIENT
Start: 2023-02-16

## 2023-03-08 ENCOUNTER — HOSPITAL ENCOUNTER (OUTPATIENT)
Dept: HEMATOLOGY ONCOLOGY | Facility: MEDICAL CENTER | Age: 33
End: 2023-03-08
Attending: INTERNAL MEDICINE
Payer: COMMERCIAL

## 2023-03-08 VITALS
DIASTOLIC BLOOD PRESSURE: 62 MMHG | OXYGEN SATURATION: 97 % | TEMPERATURE: 98.2 F | HEIGHT: 66 IN | RESPIRATION RATE: 18 BRPM | HEART RATE: 90 BPM | SYSTOLIC BLOOD PRESSURE: 96 MMHG | BODY MASS INDEX: 19.88 KG/M2 | WEIGHT: 123.68 LBS

## 2023-03-08 DIAGNOSIS — Z17.0 MALIGNANT NEOPLASM OF UPPER-OUTER QUADRANT OF RIGHT BREAST IN FEMALE, ESTROGEN RECEPTOR POSITIVE (HCC): ICD-10-CM

## 2023-03-08 DIAGNOSIS — C50.411 MALIGNANT NEOPLASM OF UPPER-OUTER QUADRANT OF RIGHT BREAST IN FEMALE, ESTROGEN RECEPTOR POSITIVE (HCC): ICD-10-CM

## 2023-03-08 PROCEDURE — 99214 OFFICE O/P EST MOD 30 MIN: CPT | Performed by: INTERNAL MEDICINE

## 2023-03-08 PROCEDURE — 99212 OFFICE O/P EST SF 10 MIN: CPT | Performed by: INTERNAL MEDICINE

## 2023-03-08 RX ORDER — TAMOXIFEN CITRATE 20 MG/1
20 TABLET ORAL DAILY
Qty: 60 TABLET | Refills: 3 | Status: SHIPPED | OUTPATIENT
Start: 2023-03-08

## 2023-03-08 ASSESSMENT — ENCOUNTER SYMPTOMS
CARDIOVASCULAR NEGATIVE: 1
CONSTITUTIONAL NEGATIVE: 1
NEUROLOGICAL NEGATIVE: 1
RESPIRATORY NEGATIVE: 1
EYES NEGATIVE: 1
MUSCULOSKELETAL NEGATIVE: 1
PSYCHIATRIC NEGATIVE: 1
GASTROINTESTINAL NEGATIVE: 1

## 2023-03-08 ASSESSMENT — FIBROSIS 4 INDEX: FIB4 SCORE: 0.79

## 2023-03-08 ASSESSMENT — PAIN SCALES - GENERAL: PAINLEVEL: NO PAIN

## 2023-03-08 NOTE — PROGRESS NOTES
Medical oncology follow-up visit: 3/8/2023      Referring Physician: Cruz Wisdom M.D.  Primary Care:  Cruz Wisdom M.D.    Diagnosis: Right breast cancer with neuroendocrine differentiation    Chief Complaint:  Right breast cancer with neuroendocrine differentiation    History of Presenting Illness:  Bekah Ronquillo is a 32 y.o. female who had an abnormal mammogram in June 2022.  She underwent biopsies of the right breast at multiple locations on 7/6/2022 in [place].  Pathology was reviewed in [facility] and showed invasive ductal carcinoma intermediate grade in 3 distinct lesions at 11:00 3 4 and 8 cm from the nipple.  They were all ER positive greater than 90% UT low +5 to 12% and HER2 negative 1+ by IHC.  GATA3 was positive as well.  She was seen at [facility] and elected to undergo bilateral mastectomies.  It should be noted that her sister was diagnosed at the same age that she was with advanced breast cancer.  At surgery Bekah  was found to have 4 foci of invasive carcinoma of which 2 were read as invasive neuroendocrine tumors with the largest at 14 mm and 12 mm.  2 additional lesions were read as invasive ductal carcinoma with focal neuroendocrine differentiation.  All 4 foci were diffusely positive for synaptic ficin and all were negative for chromogranin.  Ki-67 ranged from 15.8% to 42.7%.1 of 3 sentinel lymph nodes showed isolated tumor cells measuring 0.12 mm in greatest dimension without extranodal extension.  The left breast showed focal proliferative fibrocystic change and no evidence of malignancy.  She had immediate tissue expander reconstruction.  She was seen by Dr. De La Cruz medical oncology who presented her case at [facility] breast conference.  The consensus was treatment with chemotherapy with AC/T followed by ovarian function suppression and an AI.  From [place] and elected to come back to get her treatment.  She is currently in the midst of fertility treatments including  fletrozole with plan for embryo harvest in early January.  Of note she has a longstanding history of ulcerative colitis which has been fairly symptomatic recently.  She is using anti-inflammatory oral and topical agents only and has not been on disease modifying agents like Humira in the past.  She does have chronic pain and will be seeing the pain specialist for this.  She is premenopausal.  Multigene panel testing was negative hereditary cancer syndromes except for a VUS in BRCA2.    Interval history 1/18/2023: She completed embryo retrieval and was able to get 5 embryos that are successfully harvested and frozen.  She is feeling well after going through the treatments.  After long discussion she has decided not to pursue chemotherapy.  She is going to see radiation therapy next week.  She is willing to begin endocrine therapy and ovarian function suppression.  We discussed the results indicating safety for discontinuing endocrine therapy temporarily after at least 2 years of treatment to get pregnant and then resuming subsequently.  We will likely follow this plan.    Interval history 3/8/2023: She had 1 shot of Lupron and promptly developed severe hot flashes acne and mood changes.  She does not wish to continue with ovarian function suppression.  She is willing to take tamoxifen.  It should be noted that her sister who has breast cancer is apparently stage IV in remission on tamoxifen.  She is going to see Dr. Mendez for radiation consideration in the near future as well.      Past Medical History:   Diagnosis Date    Acquired hallux valgus of left foot 2/25/2020    Formatting of this note might be different from the original. Added automatically from request for surgery [id number]    Acquired hallux valgus of right foot 9/11/2019    Formatting of this note might be different from the original. Added automatically from request for surgery [id number]    Allergic rhinitis 2/19/2019    Anxiety disorder 12/28/2017     Cervical high risk human papillomavirus (HPV) DNA test positive 5/3/2017    Cervical intraepithelial neoplasia grade 1 5/3/2017    Formatting of this note might be different from the original. 04/2016 pap lgsil, hpv positive at [place]. Records in care everywhere. Scheduled for colposcopy later this month of 05/2017 8/21/2017 colposcopy done. No biopsies taken since pregnant. No abnormalities noted.  12/2017: ASCUS, HPV+ 5/17/2018 CIN1 6/2019-+ HPV, to schedule colpo 9/27/2019 colpo negative    Chronic pain 4/2/2021    Chronic vaginitis 1/17/2018    Deviated nasal septum 2/14/2019    Deviated nasal septum 2/14/2019    Insomnia 4/2/2021    Iron deficiency anemia 12/29/2017    Major depressive disorder, recurrent episode, in full remission (HCC) 2/14/2019    Nasal septal perforation 2/19/2019    Recurrent UTI 2/8/2018       History reviewed. No pertinent surgical history.    Social History     Tobacco Use    Smoking status: Never    Smokeless tobacco: Never   Vaping Use    Vaping Use: Never used   Substance Use Topics    Alcohol use: Never    Drug use: Never        Family History   Problem Relation Age of Onset    Heart Disease Mother     Hypertension Father     Hyperlipidemia Father     Diabetes Father     Stroke Father     Dementia Father        Allergies as of 03/08/2023    (No Known Allergies)         Current Outpatient Medications:     ondansetron (ZOFRAN ODT) 8 MG TABLET DISPERSIBLE, Take 1 Tablet by mouth every 8 hours as needed for Nausea., Disp: 15 Tablet, Rfl: 3    HYDROcodone/acetaminophen (NORCO)  MG Tab, Take 1 Tablet by mouth 3 times a day., Disp: , Rfl:     letrozole (FEMARA) 2.5 MG Tab, Take 5 mg by mouth every day., Disp: , Rfl:     simethicone (MYLICON) 80 MG Chew Tab, Please take as directed in GI direction., Disp: , Rfl:     aspirin (ASA) 81 MG Chew Tab chewable tablet, Chew 81 mg every day., Disp: , Rfl:     Follitropin Eugene (GONAL-F RFF PEN SC), Inject 225 mg under the skin. AM, Disp: , Rfl:  "    Menotropins (MENOPUR) 75 UNIT Recon Soln, Inject  under the skin., Disp: , Rfl:     Paragard Intrauterine Copper IUD, 1 Each by Intrauterine route., Disp: , Rfl:     acetaminophen (TYLENOL) 500 MG Tab, Take 1-2 Tablets by mouth every 6 hours as needed., Disp: , Rfl:     Mesalamine (LIALDA PO), Take  by mouth., Disp: , Rfl:     Review of Systems:  Review of Systems   Constitutional: Negative.    HENT: Negative.     Eyes: Negative.    Respiratory: Negative.     Cardiovascular: Negative.    Gastrointestinal: Negative.    Genitourinary: Negative.    Musculoskeletal: Negative.    Skin: Negative.    Neurological: Negative.    Endo/Heme/Allergies: Negative.    Psychiatric/Behavioral: Negative.          Physical Exam:  Vitals:    03/08/23 1547   BP: 96/62   BP Location: Left arm   Patient Position: Sitting   BP Cuff Size: Small adult   Pulse: 90   Resp: 18   Temp: 36.8 °C (98.2 °F)   TempSrc: Temporal   SpO2: 97%   Weight: 56.1 kg (123 lb 10.9 oz)   Height: 1.676 m (5' 6\")       DESC; KARNOFSKY SCALE WITH ECOG EQUIVALENT: 80, Normal activity with effort; some signs or symptoms of disease (ECOG equivalent 1)    DISTRESS LEVEL: moderate distress    Physical Exam     Exam not performed  Labs:  No visits with results within 1 Month(s) from this visit.   Latest known visit with results is:   No results found for any previous visit.       Imaging:   All listed images below have been independently reviewed by me. I agree with the findings as summarized below:    No results found.     Pathology:      Assessment & Plan:    1.  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  Is a good candidate for adjuvant chemotherapy given her age and may be a candidate for postmastectomy radiation.  She would clearly benefit from ovarian function " suppression and aromatase inhibition after successful embryo retrieval.  Declines chemotherapy and major toxicity from ovarian function suppression wishing to discontinue.  Will initiate tamoxifen.  2.  Ulcerative colitis symptomatic  3.  Strong family history of breast cancer with personal negative multigene panel ( VUS BRCA2 by report)  4.  Severe menopausal symptoms from Lupron discontinued after 1 cycle.    Plan: We will initiate tamoxifen.  I will wait 4 weeks or so and/or after radiation to initiate this so she will be back at her baseline premenopausal state to better delineate tamoxifen associated toxicities.  I will also get a PET scan at her request to rule out metastatic disease.    Any questions and concerns raised by the patient were answered to the best of my ability. Thank you for allowing me to participate in the care for this patient. Please feel free to contact me for any questions or concerns.     Oskar Qureshi M.D.

## 2023-03-13 ENCOUNTER — APPOINTMENT (OUTPATIENT)
Dept: ONCOLOGY | Facility: MEDICAL CENTER | Age: 33
End: 2023-03-13
Attending: INTERNAL MEDICINE
Payer: COMMERCIAL

## 2023-03-21 ENCOUNTER — HOSPITAL ENCOUNTER (OUTPATIENT)
Dept: RADIATION ONCOLOGY | Facility: MEDICAL CENTER | Age: 33
End: 2023-03-31
Attending: RADIOLOGY
Payer: COMMERCIAL

## 2023-03-21 VITALS
OXYGEN SATURATION: 94 % | BODY MASS INDEX: 19.15 KG/M2 | HEART RATE: 76 BPM | WEIGHT: 122 LBS | HEIGHT: 67 IN | SYSTOLIC BLOOD PRESSURE: 93 MMHG | DIASTOLIC BLOOD PRESSURE: 68 MMHG

## 2023-03-21 DIAGNOSIS — C50.411 MALIGNANT NEOPLASM OF UPPER-OUTER QUADRANT OF RIGHT BREAST IN FEMALE, ESTROGEN RECEPTOR POSITIVE (HCC): ICD-10-CM

## 2023-03-21 DIAGNOSIS — Z17.0 MALIGNANT NEOPLASM OF UPPER-OUTER QUADRANT OF RIGHT BREAST IN FEMALE, ESTROGEN RECEPTOR POSITIVE (HCC): ICD-10-CM

## 2023-03-21 PROCEDURE — 99214 OFFICE O/P EST MOD 30 MIN: CPT | Performed by: RADIOLOGY

## 2023-03-21 PROCEDURE — 99205 OFFICE O/P NEW HI 60 MIN: CPT | Performed by: RADIOLOGY

## 2023-03-21 RX ORDER — PREGABALIN 50 MG/1
50 CAPSULE ORAL 2 TIMES DAILY
COMMUNITY
Start: 2023-02-09

## 2023-03-21 RX ORDER — COPPER 313.4 MG/1
1 INTRAUTERINE DEVICE INTRAUTERINE
COMMUNITY
Start: 2021-07-01 | End: 2033-06-29

## 2023-03-21 RX ORDER — DIMENHYDRINATE 50 MG
1 TABLET ORAL 3 TIMES DAILY
COMMUNITY
Start: 2023-01-31

## 2023-03-21 RX ORDER — RIZATRIPTAN BENZOATE 10 MG/1
TABLET, ORALLY DISINTEGRATING ORAL
COMMUNITY
Start: 2023-03-09 | End: 2024-02-28

## 2023-03-21 RX ORDER — MAGNESIUM OXIDE 400 MG/1
400 TABLET ORAL DAILY
COMMUNITY
Start: 2023-01-24

## 2023-03-21 RX ORDER — ONDANSETRON 4 MG/1
4 TABLET, FILM COATED ORAL
COMMUNITY
Start: 2022-09-27 | End: 2023-03-21

## 2023-03-21 RX ORDER — SUMATRIPTAN 100 MG/1
100 TABLET, FILM COATED ORAL PRN
COMMUNITY
Start: 2022-04-13 | End: 2024-02-28

## 2023-03-21 RX ORDER — RIBOFLAVIN (VITAMIN B2) 100 MG
2 TABLET ORAL 2 TIMES DAILY
COMMUNITY
Start: 2023-01-29 | End: 2024-02-28

## 2023-03-21 RX ORDER — DEXTROAMPHETAMINE SULFATE, DEXTROAMPHETAMINE SACCHARATE, AMPHETAMINE SULFATE AND AMPHETAMINE ASPARTATE 5; 5; 5; 5 MG/1; MG/1; MG/1; MG/1
CAPSULE, EXTENDED RELEASE ORAL
COMMUNITY
Start: 2023-03-16

## 2023-03-21 ASSESSMENT — FIBROSIS 4 INDEX: FIB4 SCORE: 0.79

## 2023-03-21 ASSESSMENT — PAIN SCALES - GENERAL: PAINLEVEL: NO PAIN

## 2023-03-21 NOTE — CT SIMULATION
PATIENT NAME Bekah Ronquillo   PRIMARY PHYSICIAN Chandana Shen [id number]   REFERRING PHYSICIAN Oskar Qureshi M* 1990     Malignant neoplasm of upper-outer quadrant of right female breast (HCC)  Staging form: Breast, AJCC 8th Edition  - Pathologic stage from 11/15/2022: Stage IA (pT1, pN0(i+)(sn), cM0, G2, ER+, MA+, HER2-) - Signed by Oskar Qureshi M.D. on 12/30/2022  Stage prefix: Initial diagnosis  Method of lymph node assessment: [place] lymph node biopsy  Histologic grading system: 3 grade system         Treatment Planning CT Simulation        Order Questions       Question Answer Comment    Is this for a new course of treatment? Yes     Is this an Addendum? No     Implanted Device/Pacemaker No     Simulation Status Initial     Treatment Technique 3D CRT     Other Technique(s) 3D     Treatment Pattern/Frequency Daily     Concurrent Chemotherapy No     CT Technique 3D possible    Slice Thickness 3mm     Scan Extent Chest     Treatment Device(s) Vac Marisol      Wing Board     Treatment Marker Scar     Patient Attire Gown     Patient Position Supine     Patient Orientation Head First     Arm Position Up     Treatment Machine No preference     Treatment Image Guidance Ports for boost    Frequency (ports) Weekly     Image Guidance Match PTV - Soft Tissue     Other Orders Weekly Physics Check

## 2023-03-21 NOTE — CONSULTS
RADIATION ONCOLOGY CONSULT    DATE OF SERVICE: 3/21/2023    IDENTIFICATION: A 33 y.o. female with [id number](i+)   Right upper outer quadrant breast cancer status post bilateral subcutaneous mastectomy with 6 foci of disease the largest measuring 1.4 cm.   4 sentinel nodes were removed and there was a single focus of isolated tumor cells in 1 lymph node measuring 0.10.12 mm.  There was no extranodal extension.  There was LVI and there was 4 foci of the 6 with invasive neuroendocrine tumor intermediate grade measuring 14 mm 12 mm 10 mm and 2 mm.  There were 2 additional foci of invasive ductal carcinoma with focal neuroendocrine differentiated again with intermediate nuclear grade.   these measured 11 mm and 7 mm.  There was associated intraductal carcinoma with focal neuroendocrine differentiation.  DCIS scattered present in the upper inner and upper outer quadrants in 5 consecutive tissue slices over a 7 cm area.  The invasive carcinoma is 3 mm to the nearest superior superficial margin DCIS 2.5 mm to the nearest posterior margin.She is here at the kind request of Dr. Oskar Qureshi.      HISTORY OF PRESENT ILLNESS:  Patient's history dates back to last summer when she noted a mass in the right breast  at 11:00.  diagnostic mammogram and ultrasound were done on 7/6/2022 this showed 3 suspicious right breast masses above or around 11:00.  There was a palpable lump in the right axilla corresponding to significant coarse benign-appearing distress the calcifications.   All 3 breast masses were biopsied on 7/6/2022 : 11:00 8 cm from the nipple showed ER  PA 1-10  HER2 1+;  11:00 4 cm from the nipple invasive carcinoma ER  PA 1 to 10% HER2 1+;  11:00 3 cm from  nipple invasive mammary carcinoma all were felt to be grade 2 and there was   Only suspicious LVI in the first biopsy.   MRI scan 7/13/2022 showed multifocal breast carcinoma in the right breast upper outer quadrant and central breast spanning an area  of at least 8 x 5 cm in total with at least 6 masses identified.  3 of these were known biopsy-proven carcinoma.  There was noes nipple skin or chest wall involvement and no suspicious adenopathy.  Left breast showed probable fibroadenomas.  [facility] CT [facility] 7/21/2022 showed multifocal hypermetabolic right breast lesions consistent with known primary malignancy.  New mild FDG avid left breast lesion thought to be benign on MRI.  No other FDG avid lesions of concern and no suspicious lymphadenopathy or distant metastatic disease.    Follow-up ultrasounds were done 7/29/2022 left breast had masses that were felt to be consistent with fibroadenomas the right breast identified at 11 o'clock position mass measuring 4 x 1.8 x 1 cm 10 cm from the nipple the second mass 1.7 x 1 x 1 0.4  6.6 cm from the nipple third mass 0.7 x 0.5 x 2.54 cm from the nipple fourth mass 11:00 0.8 cm inferior to the second mass and 6 cm from the nipple measuring 0.7 x 0.8 x 0.4 overall extension of disease 8 cm in largest diameter.   She underwent bilateral nipple sparing mastectomies on 10/19/2022 with bilateral expanders placed.  4 lymph nodes were taken out 3 sentinel 1 nonsentinel 1 sentinel node showed a single focus of isolated tumor cells in 1 lymph node measuring 0.12 mm no extranodal extension was identified.   In the mastectomy specimen there were 4 foci of invasive neuroendocrine tumor intermediate nuclear grade Peculiar histological grade 2 measuring 14, 12, 10 and 2 mm respectively.  There was LVI present.  There was an additional 2 foci of invasive ductal carcinoma with focal neuroendocrine differentiation, intermediate nuclear grade Peculiar histology grade 2 measuring 11 and 7 mm in greatest dimension respectively.  There was associated intraductal carcinoma with focal neuroendocrine differentiation, intermediate new or clear grade solid type with punctate necrosis.  DCIS was scattered present in the upper inner  and upper outer quadrants and present in 5 consecutive tissue slices over a 7 cm area.  Margins are all free of tumor invasive carcinoma is 3 mm to the nearest superior superficial margin and DCIS is 2.5 mm to the nearest posterior margin.     Postoperatively she has been doing well.  She has undergone a egg preservation therapy.  She is seeing Dr. Oskar Qureshi about systemic management and is declining chemotherapy but  is interested in radiation therapy and hormone therapy.   She was seen by the radiation oncologist at [facility] who recommended 28 fractions of comprehensive radiation therapy to the reconstructed breast and draining lymphatics    PAST MEDICAL HISTORY:   Past Medical History:   Diagnosis Date    Acquired hallux valgus of left foot 02/25/2020    Formatting of this note might be different from the original. Added automatically from request for surgery [id number]    Acquired hallux valgus of right foot 09/11/2019    Formatting of this note might be different from the original. Added automatically from request for surgery [id number]    Allergic rhinitis 02/19/2019    Anxiety disorder 12/28/2017    Cervical high risk human papillomavirus (HPV) DNA test positive 05/03/2017    Cervical intraepithelial neoplasia grade 1 05/03/2017    Formatting of this note might be different from the original. 04/2016 pap lgsil, hpv positive at [place]. Records in care everywhere. Scheduled for colposcopy later this month of 05/2017 8/21/2017 colposcopy done. No biopsies taken since pregnant. No abnormalities noted.  12/2017: ASCUS, HPV+ 5/17/2018 CIN1 6/2019-+ HPV, to schedule colpo 9/27/2019 colpo negative    Chronic pain 04/02/2021    Chronic vaginitis 01/17/2018    Deviated nasal septum 02/14/2019    Deviated nasal septum 02/14/2019    Insomnia 04/02/2021    Iron deficiency anemia 12/29/2017    Major depressive disorder, recurrent episode, in full remission (HCC) 02/14/2019    Malignant neoplasm of upper-outer quadrant  of right female breast (HCC)     Migraines     Nasal septal perforation 02/19/2019    Recurrent UTI 02/08/2018       PAST SURGICAL HISTORY:  Past Surgical History:   Procedure Laterality Date    AXILLARY NODE DISSECTION Right     BUNIONECTOMY Bilateral     COLONOSCOPY      MASTECTOMY      TISSUE EXPANDER PLACE/REMOVE Bilateral        GYNECOLOGICAL STATUS:  G2, P0, A2    CURRENT MEDICATIONS:  Current Outpatient Medications   Medication Sig Dispense Refill    ADDERALL XR, 20MG, 20 MG per XR capsule TAKE UP TO 2 CAPSULES BY MOUTH EVERY MORNING FOR ADHD      Coenzyme Q10 (CO Q-10) 100 MG Cap Take 1 Capsule by mouth 3 times a day.      Paragard Intrauterine Copper IUD 1 Intra Uterine Device by Intrauterine route.      magnesium oxide (MAG-OX) 400 MG Tab tablet Take 400 mg by mouth every day.      pregabalin (LYRICA) 50 MG capsule Take 50 mg by mouth 2 times a day.      Riboflavin (B-2) 100 MG Tab Take 2 Tablets by mouth 2 times a day.      rizatriptan (MAXALT-MLT) 10 MG disintegrating tablet DISSOLVE 1 TABLET ON THE TONGUE AS NEEDED AT ONSET OF MIGRAINE HEADACHE. MAY REPEAT EVERY 2 HOURS FOR UP TO 3 TABLETS IN 24 HOURS IF NO RELIEF      sumatriptan (IMITREX) 100 MG tablet Take 100 mg by mouth as needed.      ondansetron (ZOFRAN ODT) 8 MG TABLET DISPERSIBLE Take 1 Tablet by mouth every 8 hours as needed for Nausea. 15 Tablet 3    HYDROcodone-acetaminophen (NORCO) 5-325 MG Tab per tablet Take 1 Tablet by mouth 3 times a day.      simethicone (MYLICON) 80 MG Chew Tab Please take as directed in GI direction.      acetaminophen (TYLENOL) 500 MG Tab Take 1-2 Tablets by mouth every 6 hours as needed.      Mesalamine (LIALDA PO) Take  by mouth.      tamoxifen (NOLVADEX) 20 MG tablet Take 1 Tablet by mouth every day. (Patient not taking: Reported on 3/21/2023) 60 Tablet 3    letrozole (FEMARA) 2.5 MG Tab Take 5 mg by mouth every day. (Patient not taking: Reported on 3/21/2023)      aspirin (ASA) 81 MG Chew Tab chewable tablet  "Chew 81 mg every day. (Patient not taking: Reported on 3/21/2023)      Follitropin Eugene (GONAL-F RFF PEN SC) Inject 225 mg under the skin. AM (Patient not taking: Reported on 3/21/2023)      Menotropins (MENOPUR) 75 UNIT Recon Soln Inject  under the skin. (Patient not taking: Reported on 3/21/2023)       No current facility-administered medications for this encounter.       ALLERGIES:    Patient has no known allergies.    FAMILY HISTORY:    Family History   Problem Relation Age of Onset    Heart Disease Mother     Hypertension Father     Hyperlipidemia Father     Diabetes Father     Stroke Father     Dementia Father     Breast Cancer Sister     Cancer Sister     Cancer Paternal Aunt         Cervical    Cancer Paternal Grandfather         Lung   [unfilled]        SOCIAL HISTORY:     reports that she has never smoked. She has never used smokeless tobacco. She reports that she does not drink alcohol and does not use drugs.  Patient is a 33 year old female who resides in [place] with her Spouse. She is not currently working.    REVIEW OF SYSTEMS: Pertinent positives consist   Are negative  The rest of the review of systems is negative and has been reviewed.     PAIN:   Patient reports no pain at today's visit.       PHYSICAL EXAM:    0= Fully active, able to carry on all pre-disease performance without restriction.  Vitals:    03/21/23 0815   BP: 93/68   BP Location: Left arm   Patient Position: Sitting   BP Cuff Size: Adult   Pulse: 76   SpO2: 94%   Weight: 55.3 kg (122 lb)   Height: 1.702 m (5' 7\")   Pain Score: No pain        GENERAL:  well-appearing alert and oriented x3 in no apparent distress   chest wall: Bilateral reconstructed breast show excellent cosmesis.  Very thin skin over the  expanders.  There is no evidence of any palpable tumor recurrence on any of the skin or bilateral axillae  HEENT:  Pupils are equal, round, and reactive to light.  Extraocular muscles   are intact. Sclerae nonicteric.  Conjunctivae " pink.  Oral cavity, tongue   protrudes midline.   NECK:   No peripheral adenopathy of the neck, supraclavicular fossa or axillae   bilaterally.  LUNGS:  Clear to ascultation   HEART:  Regular rate and rhythm.  No murmur appreciated  ABDOMEN:  Soft. No evidence of hepatosplenomegaly.    EXTREMITIES:  Without Edema.  NEUROLOGIC:  Cranial nerves II through XII were intact. Normal stance and gait motor and sensory grossly within normal limits                IMPRESSION:    A 33 y.o. with  [id number](i+)   Right upper outer quadrant breast cancer status post bilateral subcutaneous mastectomy with 6 foci of disease the largest measuring 1.4 cm.   4 sentinel nodes were removed and there was a single focus of isolated tumor cells in 1 lymph node measuring 0.10.12 mm.  There was no extranodal extension.  There was LVI and there was 4 foci of the 6 with invasive neuroendocrine tumor intermediate grade measuring 14 mm 12 mm 10 mm and 2 mm.  There were 2 additional foci of invasive ductal carcinoma with focal neuroendocrine differentiated again with intermediate nuclear grade.   these measured 11 mm and 7 mm.  There was associated intraductal carcinoma with focal neuroendocrine differentiation.  DCIS scattered present in the upper inner and upper outer quadrants in 5 consecutive tissue slices over a 7 cm area.  The invasive carcinoma is 3 mm to the nearest superior superficial margin DCIS 2.5 mm to the nearest posterior margin.      RECOMMENDATIONS:    I had a long discussion with the patient and her .  I am also recommending radiation therapy to decrease her chance of local regional recurrence.  she falls into a gray area however she does have multiple factors that make us think about considering postoperative radiation therapy.  She is young age, she has multicentric disease  with the span of tumor measuring well over 5 cm.  She has 6 foci of cancer and LVI is present.  She is also declining chemotherapy.  Because of these  factors I am recommending postoperative radiation therapy.  I've described the details of radiation along with the side effects both acute and chronic, including but not exclusive to fatigue, skin reaction, local soreness, swelling, delayed healing, scarring fibrosis discoloration.   She also understands that there can be more difficulty with the implant when radiation therapy is added.Ample time was allowed for questions, and patient understands.      She is tentatively scheduled for simulation next week to get started soon thereafter.    Thank you for the opportunity to participate in her care.  If any questions or comments, please do not hesitate in calling.    Please note that this dictation was created using voice recognition software. I have made every reasonable attempt to correct obvious errors, but I expect that there are errors of grammar and possibly content that I did not discover before finalizing the note.

## 2023-03-21 NOTE — PROGRESS NOTES
"Patient was seen today in clinic with Dr. Mendez for consultation.  Vitals signs and weight were obtained and pain assessment was completed.  Allergies and medications were reviewed with the patient.      Vitals/Pain:  Vitals:    03/21/23 0815   BP: 93/68   BP Location: Left arm   Patient Position: Sitting   BP Cuff Size: Adult   Pulse: 76   SpO2: 94%   Weight: 55.3 kg (122 lb)   Height: 1.702 m (5' 7\")   Pain Score: No pain        Allergies:   Patient has no known allergies.    Current Medications:  Current Outpatient Medications   Medication Sig Dispense Refill    ADDERALL XR, 20MG, 20 MG per XR capsule TAKE UP TO 2 CAPSULES BY MOUTH EVERY MORNING FOR ADHD      Coenzyme Q10 (CO Q-10) 100 MG Cap Take 1 Capsule by mouth 3 times a day.      Paragard Intrauterine Copper IUD 1 Intra Uterine Device by Intrauterine route.      magnesium oxide (MAG-OX) 400 MG Tab tablet Take 400 mg by mouth every day.      pregabalin (LYRICA) 50 MG capsule Take 50 mg by mouth 2 times a day.      Riboflavin (B-2) 100 MG Tab Take 2 Tablets by mouth 2 times a day.      rizatriptan (MAXALT-MLT) 10 MG disintegrating tablet DISSOLVE 1 TABLET ON THE TONGUE AS NEEDED AT ONSET OF MIGRAINE HEADACHE. MAY REPEAT EVERY 2 HOURS FOR UP TO 3 TABLETS IN 24 HOURS IF NO RELIEF      sumatriptan (IMITREX) 100 MG tablet Take 100 mg by mouth as needed.      ondansetron (ZOFRAN ODT) 8 MG TABLET DISPERSIBLE Take 1 Tablet by mouth every 8 hours as needed for Nausea. 15 Tablet 3    HYDROcodone-acetaminophen (NORCO) 5-325 MG Tab per tablet Take 1 Tablet by mouth 3 times a day.      simethicone (MYLICON) 80 MG Chew Tab Please take as directed in GI direction.      acetaminophen (TYLENOL) 500 MG Tab Take 1-2 Tablets by mouth every 6 hours as needed.      Mesalamine (LIALDA PO) Take  by mouth.      tamoxifen (NOLVADEX) 20 MG tablet Take 1 Tablet by mouth every day. (Patient not taking: Reported on 3/21/2023) 60 Tablet 3    letrozole (FEMARA) 2.5 MG Tab Take 5 mg by " mouth every day. (Patient not taking: Reported on 3/21/2023)      aspirin (ASA) 81 MG Chew Tab chewable tablet Chew 81 mg every day. (Patient not taking: Reported on 3/21/2023)      Follitropin Eugene (GONAL-F RFF PEN SC) Inject 225 mg under the skin. AM (Patient not taking: Reported on 3/21/2023)      Menotropins (MENOPUR) 75 UNIT Recon Soln Inject  under the skin. (Patient not taking: Reported on 3/21/2023)       No current facility-administered medications for this encounter.         PCP:  Ac Lovell R.N.

## 2023-03-22 ENCOUNTER — PATIENT OUTREACH (OUTPATIENT)
Dept: OTHER | Facility: MEDICAL CENTER | Age: 33
End: 2023-03-22
Payer: MEDICAID

## 2023-03-22 ENCOUNTER — HOSPITAL ENCOUNTER (OUTPATIENT)
Dept: RADIOLOGY | Facility: MEDICAL CENTER | Age: 33
End: 2023-03-22
Attending: INTERNAL MEDICINE
Payer: COMMERCIAL

## 2023-03-22 NOTE — PROGRESS NOTES
Oncology Nurse Navigation   Phoned pt for follow up.  Pt denies any questions or concerns and states she will call ONN should anything arise.

## 2023-03-27 ENCOUNTER — HOSPITAL ENCOUNTER (OUTPATIENT)
Dept: RADIATION ONCOLOGY | Facility: MEDICAL CENTER | Age: 33
End: 2023-03-27

## 2023-03-27 PROCEDURE — 77334 RADIATION TREATMENT AID(S): CPT | Mod: 26 | Performed by: RADIOLOGY

## 2023-03-27 PROCEDURE — 77334 RADIATION TREATMENT AID(S): CPT | Performed by: RADIOLOGY

## 2023-03-27 PROCEDURE — 77263 THER RADIOLOGY TX PLNG CPLX: CPT | Performed by: RADIOLOGY

## 2023-03-27 PROCEDURE — 77290 THER RAD SIMULAJ FIELD CPLX: CPT | Performed by: RADIOLOGY

## 2023-03-27 PROCEDURE — 77290 THER RAD SIMULAJ FIELD CPLX: CPT | Mod: 26 | Performed by: RADIOLOGY

## 2023-03-27 NOTE — RADIATION COMPLETION NOTES
DATE OF SERVICE: 3/27/2023    DIAGNOSIS:  Malignant neoplasm of upper-outer quadrant of right female breast (HCC)  Staging form: Breast, AJCC 8th Edition  - Pathologic stage from 11/15/2022: Stage IA (pT1, pN0(i+)(sn), cM0, G2, ER+, [place]+, HER2-) - Signed by Oskar Qureshi M.D. on 12/30/2022  Stage prefix: Initial diagnosis  Method of lymph node assessment: [place] lymph node biopsy  Histologic grading system: 3 grade system       DATE OF SERVICE: 3/27/2023    TYPE OF SIMULATION: Breast       [x] Right    [] Left      GOAL OF TREATMENT:   [x] Curative  [] Palliative  [] Oligometastatic    COMPLEX:  [x] Complex Blocking   []Arcs  [] Custom Blocks  [] >3 Sites    PROCEDURE: Patient placed in supine position on wing board with VAC CHYNA bag with appropriate slant to compensate for slope of chest wall.  Breast/chest wall borders marked CT scan obtained to contain entire volume of interest.      I have personally reviewed the relevant data, performed the target localization, and determined all relevant factors for this patient’s simulation.

## 2023-03-27 NOTE — RADIATION COMPLETION NOTES
Clinical Treatment Planning Note    DATE OF SERVICE: 3/27/2023    DIAGNOSIS:  Malignant neoplasm of upper-outer quadrant of right female breast (HCC)  Staging form: Breast, AJCC 8th Edition  - Pathologic stage from 11/15/2022: Stage IA (pT1, pN0(i+)(sn), cM0, G2, ER+, [place]+, HER2-) - Signed by Oskar Qureshi M.D. on 12/30/2022  Stage prefix: Initial diagnosis  Method of lymph node assessment: [place] lymph node biopsy  Histologic grading system: 3 grade system         IMAGING REVIEWED:  [] CT     [] MRI     [] PET/CT     [] BONE SCAN     [] MAMMO     [] OTHER      TREATMENT INTENT:   [] CURATIVE     [] MAINTENANCE     []  PALLIATIVE      []  SUPPORTIVE     []  PROPHYLACTIC     [] BENIGN     []  CONSOLIDATIVE      [] DEFINITIVE   []  OLOGIMETASTATIC      LINE OF TREATMENT:  [] ADJUVANT   [] DEFINITIVE   [] NEOADJUVANT   [] RE-TREATMENT      TECHNIQUE PLANNED:  [] IMRT   [x] 3D   [] SBRT   [] SRS/SRT   [] HDR   [] ELECTRON       IMRT JUSTIFICATION:  []   An immediately adjacent area has been previously irradiated and abutting portals must be established with high precision.    []  Dose escalation is planned to deliver radiation doses in excess of those commonly utilized for similar tumors with conventional treatment.    []  The target volume is concave or convex, and the critical normal tissues are within or around that convexity or concavity.    []  The target volume is in close proximity to critical structures that must be protected.    []  The volume of interest must be covered with narrow margins to adequately protect  immediately adjacent structures.      FIELDS & BLOCKING:  [x] COMPLEX BLOCKS     []  = 3 TX AREAS     []  ARCS     []  CUSTOM SHEILD        []  SIMPLE BLOCK      CHEMOTHERAPY:  []  CONCURRENT     []  INDUCTION     [] SEQUENTIAL     []  <30 DAYS FROM XRT      NOTES:  OAR CONSTRAINTS: (GUIDELINES ONLY NOT ABSOLUTE)  Target Prescribed Coverage   PTV 95% of PTV covered by 95% (cGy) of RX Dose      PTV 90% of PTV covered by 90% (cGy) of RX Dose       SIENNA Goal   Plan Hot Spot Max Dose < 115%   Contralateral Breast V10Gy < 5%   Contralateral Breast V5Gy < 20%   Ipsilateral Lung V20Gy < 15%   Ipsilateral Lung V10Gy < 35%   Ipsilateral Lung V5Gy < 50%   Contralateral Lung V5Gy < 10%   Heart V20Gy < 5%   *RTOG 1005, IJROBP 78(1104-110

## 2023-03-31 PROCEDURE — 77334 RADIATION TREATMENT AID(S): CPT | Performed by: RADIOLOGY

## 2023-03-31 PROCEDURE — 77334 RADIATION TREATMENT AID(S): CPT | Mod: 26 | Performed by: RADIOLOGY

## 2023-03-31 PROCEDURE — 77300 RADIATION THERAPY DOSE PLAN: CPT | Mod: 26 | Performed by: RADIOLOGY

## 2023-03-31 PROCEDURE — 77295 3-D RADIOTHERAPY PLAN: CPT | Mod: 26 | Performed by: RADIOLOGY

## 2023-03-31 PROCEDURE — 77300 RADIATION THERAPY DOSE PLAN: CPT | Performed by: RADIOLOGY

## 2023-03-31 PROCEDURE — 77295 3-D RADIOTHERAPY PLAN: CPT | Performed by: RADIOLOGY

## 2023-04-03 ENCOUNTER — HOSPITAL ENCOUNTER (OUTPATIENT)
Dept: RADIATION ONCOLOGY | Facility: MEDICAL CENTER | Age: 33
End: 2023-04-03

## 2023-04-03 ENCOUNTER — HOSPITAL ENCOUNTER (OUTPATIENT)
Dept: RADIATION ONCOLOGY | Facility: MEDICAL CENTER | Age: 33
End: 2023-04-30
Attending: RADIOLOGY
Payer: COMMERCIAL

## 2023-04-03 LAB

## 2023-04-03 PROCEDURE — 77412 RADIATION TX DELIVERY LVL 3: CPT | Performed by: RADIOLOGY

## 2023-04-03 PROCEDURE — 77280 THER RAD SIMULAJ FIELD SMPL: CPT | Performed by: RADIOLOGY

## 2023-04-03 PROCEDURE — 77280 THER RAD SIMULAJ FIELD SMPL: CPT | Mod: 26 | Performed by: RADIOLOGY

## 2023-04-03 NOTE — CT SIMULATION
DATE OF SERVICE: 4/3/2023    Radiation Therapy Episodes       Active Episodes       Radiation Therapy: 3D CRT                   Radiation Treatments         Plan Last Treated On Elapsed Days Fractions Treated Prescribed Fraction Dose (cGy) Prescribed Total Dose (cGy)    R CW 4/3/2023 0 @ [id number] 1 of 28 180 5,040    R SCV 4/3/2023 0 @ [id number] 1 of 28 180 5,040                  Reference Point Last Treated On Elapsed Days Most Recent Session Dose (cGy) Total Dose (cGy)    R CW 4/3/2023 0 @ [id number] 180 180    R CW CP 4/3/2023 0 @ [id number] 180 180    R SCV 4/3/2023 0 @ [id number] 180 180    R SCV CP 4/3/2023 0 @ [id number] 180 180                            First Visit Simple Simulation: Called by [employer] machine to verify treatment parameters including:  treatment site, treatment dose, and treatment setup prior to first treatment. Image derived shifts reviewed in all appropriate planes.  Shifts approved.  Patient treated.    I have personally reviewed the relevant data, performed the target localization, and determined all relevant factors for this patient’s simulation.

## 2023-04-04 ENCOUNTER — HOSPITAL ENCOUNTER (OUTPATIENT)
Dept: RADIATION ONCOLOGY | Facility: MEDICAL CENTER | Age: 33
End: 2023-04-04
Payer: COMMERCIAL

## 2023-04-04 LAB

## 2023-04-04 PROCEDURE — 77412 RADIATION TX DELIVERY LVL 3: CPT | Performed by: RADIOLOGY

## 2023-04-06 ENCOUNTER — HOSPITAL ENCOUNTER (OUTPATIENT)
Dept: RADIATION ONCOLOGY | Facility: MEDICAL CENTER | Age: 33
End: 2023-04-06
Payer: COMMERCIAL

## 2023-04-06 LAB

## 2023-04-06 PROCEDURE — 77412 RADIATION TX DELIVERY LVL 3: CPT | Performed by: RADIOLOGY

## 2023-04-06 PROCEDURE — 77336 RADIATION PHYSICS CONSULT: CPT | Performed by: RADIOLOGY

## 2023-04-07 ENCOUNTER — HOSPITAL ENCOUNTER (OUTPATIENT)
Dept: RADIATION ONCOLOGY | Facility: MEDICAL CENTER | Age: 33
End: 2023-04-07
Payer: COMMERCIAL

## 2023-04-07 LAB

## 2023-04-07 PROCEDURE — 77412 RADIATION TX DELIVERY LVL 3: CPT | Performed by: RADIOLOGY

## 2023-04-10 ENCOUNTER — HOSPITAL ENCOUNTER (OUTPATIENT)
Dept: RADIATION ONCOLOGY | Facility: MEDICAL CENTER | Age: 33
End: 2023-04-10
Payer: COMMERCIAL

## 2023-04-10 DIAGNOSIS — G43.809 OTHER MIGRAINE WITHOUT STATUS MIGRAINOSUS, NOT INTRACTABLE: ICD-10-CM

## 2023-04-10 LAB

## 2023-04-10 PROCEDURE — 77412 RADIATION TX DELIVERY LVL 3: CPT | Performed by: RADIOLOGY

## 2023-04-10 PROCEDURE — 77427 RADIATION TX MANAGEMENT X5: CPT | Performed by: RADIOLOGY

## 2023-04-10 NOTE — ON TREATMENT VISIT
ON TREATMENT NOTE  RADIATION ONCOLOGY DEPARTMENT    Patient name:  Bekah Ronquillo    Primary Physician:  Chandana Shen M.D. MRN: [id number]  [facility]: [id number]   Referring physician:  Oskar Qureshi M* : 1990, 33 y.o.     ENCOUNTER DATE:  04/10/23    DIAGNOSIS:    Malignant neoplasm of upper-outer quadrant of right female breast (HCC)  Staging form: Breast, AJCC 8th Edition  - Pathologic stage from 11/15/2022: Stage IA (pT1, pN0(i+)(sn), cM0, G2, ER+, [place]+, HER2-) - Signed by Oskar Qureshi M.D. on 2022  Stage prefix: Initial diagnosis  Method of lymph node assessment: [place] lymph node biopsy  Histologic grading system: 3 grade system      TREATMENT SUMMARY:  Radiation Treatments       Active   Plans   R CW   Most recent treatment: Dose planned: 180 cGy (fraction 5 of 28 on 4/10/2023)   Total: Dose planned: 5,040 cGy   Elapsed Days: 7 @ [id number]      R SCV   Most recent treatment: Dose planned: 180 cGy (fraction 5 of 28 on 4/10/2023)   Total: Dose planned: 5,040 cGy   Elapsed Days: 7 @ [id number]      Reference Points   R CW   Most recent treatment: Dose given: 180 cGy (on 4/10/2023)   Total: Dose given: 900 cGy   Elapsed Days: 7 @ [id number]      R CW CP   Most recent treatment: Dose given: 180 cGy (on 4/10/2023)   Total: Dose given: 900 cGy   Elapsed Days: 7 @ [id number]      R SCV   Most recent treatment: Dose given: 180 cGy (on 4/10/2023)   Total: Dose given: 900 cGy   Elapsed Days: 7 @ [id number]      R SCV CP   Most recent treatment: Dose given: 180 cGy (on 4/10/2023)   Total: Dose given: 900 cGy   Elapsed Days: 7 @ [id number]                      SUBJECTIVE:     Tolerating radiation fairly well.  Has a lot of personal stress.  Does note a little bit of tightness in the irradiated field.      VITAL SIGNS:  There were no vitals taken for this visit.          3/21/2023     8:15 AM 3/8/2023     3:47 PM 2023     2:51 PM 2022     3:06 PM   Pain Assessment   Pain  Score NO PAIN NO PAIN 6=MODERATE P NO PAIN   Pain Loc   GENERALIZED           PHYSICAL EXAM:    No erythema         View : No data to display.                  IMPRESSION:  Cancer Staging   Malignant neoplasm of upper-outer quadrant of right female breast (HCC)  Staging form: Breast, AJCC 8th Edition  - Pathologic stage from 11/15/2022: Stage IA (pT1, pN0(i+)(sn), cM0, G2, ER+, [place]+, HER2-) - Signed by Oskar Qureshi M.D. on 12/30/2022      PLAN:  No change in treatment plan.   Because of a migraine she was unable to come in for treatment on Wednesday.  I put in a referral for the neurology headache clinic.    Disposition:  Treatment plan reviewed. Questions answered. Continue therapy outlined.     Pratibha Mendez M.D.    Orders Placed This Encounter    Referral to Neurology

## 2023-04-11 ENCOUNTER — HOSPITAL ENCOUNTER (OUTPATIENT)
Dept: RADIATION ONCOLOGY | Facility: MEDICAL CENTER | Age: 33
End: 2023-04-11
Payer: COMMERCIAL

## 2023-04-11 VITALS — HEART RATE: 120 BPM | DIASTOLIC BLOOD PRESSURE: 76 MMHG | SYSTOLIC BLOOD PRESSURE: 107 MMHG | OXYGEN SATURATION: 96 %

## 2023-04-11 LAB
CHEMOTHERAPY INFUSION START DATE: NORMAL
CHEMOTHERAPY RECORDS: 1.8
CHEMOTHERAPY RECORDS: 1.8
CHEMOTHERAPY RECORDS: 2520
CHEMOTHERAPY RECORDS: 5040
CHEMOTHERAPY RECORDS: NORMAL
CHEMOTHERAPY RX CANCER: NORMAL
DATE 1ST CHEMO CANCER: NORMAL
RAD ONC ARIA COURSE LAST TREATMENT DATE: NORMAL
RAD ONC ARIA COURSE TREATMENT ELAPSED DAYS: NORMAL
RAD ONC ARIA REFERENCE POINT DOSAGE GIVEN TO DATE: 1.8
RAD ONC ARIA REFERENCE POINT DOSAGE GIVEN TO DATE: 1.8
RAD ONC ARIA REFERENCE POINT DOSAGE GIVEN TO DATE: 10.8
RAD ONC ARIA REFERENCE POINT DOSAGE GIVEN TO DATE: 10.8
RAD ONC ARIA REFERENCE POINT ID: NORMAL
RAD ONC ARIA REFERENCE POINT SESSION DOSAGE GIVEN: 1.8

## 2023-04-11 PROCEDURE — 77417 THER RADIOLOGY PORT IMAGE(S): CPT | Performed by: RADIOLOGY

## 2023-04-11 PROCEDURE — 77412 RADIATION TX DELIVERY LVL 3: CPT | Performed by: RADIOLOGY

## 2023-04-11 PROCEDURE — 77332 RADIATION TREATMENT AID(S): CPT | Mod: 26,XU | Performed by: RADIOLOGY

## 2023-04-11 PROCEDURE — 77300 RADIATION THERAPY DOSE PLAN: CPT | Performed by: RADIOLOGY

## 2023-04-11 PROCEDURE — 77332 RADIATION TREATMENT AID(S): CPT | Mod: XU | Performed by: RADIOLOGY

## 2023-04-11 PROCEDURE — 77300 RADIATION THERAPY DOSE PLAN: CPT | Mod: 26 | Performed by: RADIOLOGY

## 2023-04-11 ASSESSMENT — PAIN SCALES - GENERAL: PAINLEVEL: NO PAIN

## 2023-04-12 ENCOUNTER — HOSPITAL ENCOUNTER (OUTPATIENT)
Dept: RADIATION ONCOLOGY | Facility: MEDICAL CENTER | Age: 33
End: 2023-04-12
Payer: COMMERCIAL

## 2023-04-12 ENCOUNTER — APPOINTMENT (OUTPATIENT)
Dept: RADIATION ONCOLOGY | Facility: MEDICAL CENTER | Age: 33
End: 2023-04-12
Attending: RADIOLOGY
Payer: COMMERCIAL

## 2023-04-12 LAB
CHEMOTHERAPY INFUSION START DATE: NORMAL
CHEMOTHERAPY RECORDS: 1.8
CHEMOTHERAPY RECORDS: 1.8
CHEMOTHERAPY RECORDS: 2520
CHEMOTHERAPY RECORDS: 5040
CHEMOTHERAPY RECORDS: NORMAL
CHEMOTHERAPY RX CANCER: NORMAL
DATE 1ST CHEMO CANCER: NORMAL
RAD ONC ARIA COURSE LAST TREATMENT DATE: NORMAL
RAD ONC ARIA COURSE TREATMENT ELAPSED DAYS: NORMAL
RAD ONC ARIA REFERENCE POINT DOSAGE GIVEN TO DATE: 12.6
RAD ONC ARIA REFERENCE POINT DOSAGE GIVEN TO DATE: 12.6
RAD ONC ARIA REFERENCE POINT DOSAGE GIVEN TO DATE: 3.6
RAD ONC ARIA REFERENCE POINT DOSAGE GIVEN TO DATE: 3.6
RAD ONC ARIA REFERENCE POINT ID: NORMAL
RAD ONC ARIA REFERENCE POINT SESSION DOSAGE GIVEN: 1.8

## 2023-04-12 PROCEDURE — 77412 RADIATION TX DELIVERY LVL 3: CPT | Performed by: RADIOLOGY

## 2023-04-12 NOTE — ON TREATMENT VISIT
ON TREATMENT NOTE  RADIATION ONCOLOGY DEPARTMENT    Patient name:  Bekah Ronquillo    Primary Physician:  Chandana Shen M.D. MRN: [id number]  CSN: [id number]   Referring physician:  Oskar Qureshi M* : 1990, 33 y.o.     ENCOUNTER DATE:  23    DIAGNOSIS:    Malignant neoplasm of upper-outer quadrant of right female breast (HCC)  Staging form: Breast, AJCC 8th Edition  - Pathologic stage from 11/15/2022: Stage IA (pT1, pN0(i+)(sn), cM0, G2, ER+, [place]+, HER2-) - Signed by Oskar Qureshi M.D. on 2022  Stage prefix: Initial diagnosis  Method of lymph node assessment: [place] lymph node biopsy  Histologic grading system: 3 grade system      TREATMENT SUMMARY:  Radiation Treatments       Active   Plans   R CW   Most recent treatment: Dose planned: 180 cGy (fraction 5 of 28 on 4/10/2023)   Total: Dose planned: 5,040 cGy   Elapsed Days: 7 @ [id number]      R CW Bolus   Most recent treatment: Dose planned: 180 cGy (fraction 1 of 14 on 2023)   Total: Dose planned: 2,520 cGy   Elapsed Days: 8 @ [id number]      R SCV   Most recent treatment: Dose planned: 180 cGy (fraction 6 of 28 on 2023)   Total: Dose planned: 5,040 cGy   Elapsed Days: 8 @ [id number]      Reference Points   R CW   Most recent treatment: Dose given: 180 cGy (on 4/10/2023)   Total: Dose given: 900 cGy   Elapsed Days: 7 @ [id number]      R CW Bolus   Most recent treatment: Dose given: 180 cGy (on 2023)   Total: Dose given: 180 cGy   Elapsed Days: 8 @ [id number]      R CW CP   Most recent treatment: Dose given: 180 cGy (on 4/10/2023)   Total: Dose given: 900 cGy   Elapsed Days: 7 @ [id number]      R SCV   Most recent treatment: Dose given: 180 cGy (on 2023)   Total: Dose given: 1,080 cGy   Elapsed Days: 8 @ [id number]      R SCV CP   Most recent treatment: Dose given: 180 cGy (on 2023)   Total: Dose given: 1,080 cGy   Elapsed Days: 8 @ [id number]      Rcw Bolus cp   Most recent treatment:  Dose given: 180 cGy (on 4/11/2023)   Total: Dose given: 180 cGy   Elapsed Days: 8 @ [id number]                      SUBJECTIVE:     Tolerating treatment without event      VITAL SIGNS:  /76 (BP Location: Left arm, Patient Position: Sitting, BP Cuff Size: Adult)   Pulse (!) 120   SpO2 96%    Encounter Vitals  Blood Pressure: 107/76  Pulse: (!) 120  Pulse Oximetry: 96 %  Pain Score: No pain      4/11/2023     3:05 PM 3/21/2023     8:15 AM 3/8/2023     3:47 PM 1/18/2023     2:51 PM 12/30/2022     3:06 PM   Pain Assessment   Pain Score NO PAIN NO PAIN NO PAIN 6=MODERATE P NO PAIN   Pain Loc    GENERALIZED           PHYSICAL EXAM:    No erythema        4/11/2023     3:05 PM   Toxicity Assessment   Toxicity Assessment Breast   Fatigue (lethargy, malaise, asthenia) Increased fatigue over baseline, but not altering normal activities   Fever (in the absence of neutropenia) None   Radiation Dermatitis Faint erythema or dry desquamation   Lymphatics Normal   RT - Pain due to RT None   Dyspnea Normal         IMPRESSION:  Cancer Staging   Malignant neoplasm of upper-outer quadrant of right female breast (HCC)  Staging form: Breast, AJCC 8th Edition  - Pathologic stage from 11/15/2022: Stage IA (pT1, pN0(i+)(sn), cM0, G2, ER+, [place]+, HER2-) - Signed by Oskar Qureshi M.D. on 12/30/2022      PLAN:  No change in treatment plan    Disposition:  Treatment plan reviewed. Questions answered. Continue therapy outlined.     Pratibha Mendez M.D.    No orders of the defined types were placed in this encounter.

## 2023-04-14 ENCOUNTER — PATIENT OUTREACH (OUTPATIENT)
Dept: OTHER | Facility: MEDICAL CENTER | Age: 33
End: 2023-04-14
Payer: MEDICAID

## 2023-04-14 NOTE — PROGRESS NOTES
Oncology Nurse Navigation   Pt no call no show for radiation appointments. Phoned for follow up.  No answer, left message.

## 2023-04-20 ENCOUNTER — PATIENT OUTREACH (OUTPATIENT)
Dept: OTHER | Facility: MEDICAL CENTER | Age: 33
End: 2023-04-20
Payer: MEDICAID

## 2023-04-20 NOTE — PROGRESS NOTES
"Oncology Nurse Navigation   Pt no called no showed to multiple radiation appointments.  Multiple attempts made by staff to reach pt.    Phoned pt to assess for barriers and offer assistance.  Pt states she is having \"family issues\" and that her sister has been in and out of the hospital.  Pt states she plans on coming to treatment tomorrow.  Pt states she is safe and denies any need for support or resources at this time.  Requested pt call the radiation department if she is unable to make her appointment.  Pt agrees.      Update provided to Amaris Carvajal, Manager of Radiation Oncology Services and to Radiation Therapists.    "

## 2023-04-24 LAB
CHEMOTHERAPY INFUSION START DATE: NORMAL
CHEMOTHERAPY INFUSION STOP DATE: NORMAL
CHEMOTHERAPY RECORDS: 1.8
CHEMOTHERAPY RECORDS: 2520
CHEMOTHERAPY RECORDS: 5040
CHEMOTHERAPY RECORDS: 5040
CHEMOTHERAPY RECORDS: NORMAL
CHEMOTHERAPY RX CANCER: NORMAL
DATE 1ST CHEMO CANCER: NORMAL
RAD ONC ARIA COURSE LAST TREATMENT DATE: NORMAL
RAD ONC ARIA COURSE TREATMENT ELAPSED DAYS: NORMAL
RAD ONC ARIA REFERENCE POINT DOSAGE GIVEN TO DATE: 12.6
RAD ONC ARIA REFERENCE POINT DOSAGE GIVEN TO DATE: 12.6
RAD ONC ARIA REFERENCE POINT DOSAGE GIVEN TO DATE: 3.6
RAD ONC ARIA REFERENCE POINT DOSAGE GIVEN TO DATE: 3.6
RAD ONC ARIA REFERENCE POINT DOSAGE GIVEN TO DATE: 9
RAD ONC ARIA REFERENCE POINT DOSAGE GIVEN TO DATE: 9
RAD ONC ARIA REFERENCE POINT ID: NORMAL

## 2023-04-24 PROCEDURE — 77336 RADIATION PHYSICS CONSULT: CPT | Performed by: RADIOLOGY

## 2023-04-25 ENCOUNTER — TELEPHONE (OUTPATIENT)
Dept: RADIATION ONCOLOGY | Facility: MEDICAL CENTER | Age: 33
End: 2023-04-25
Payer: MEDICAID

## 2023-04-25 NOTE — TELEPHONE ENCOUNTER
Telephone follow-up.    This is a 33-year-old fit patient with multifocal T1c N0 I plus right upper outer quadrant breast cancer status post bilateral subcutaneous mastectomies.  She is in the process of receiving radiation therapy postoperatively as recommended by MD Mitchell.  I called her today as she has did not come in for her radiation treatments last week and several days of the week before and did not come in yesterday or today.  She did finally answer the phone and she said that she would now would continue to come in for her treatments.  I explained to her this is for her benefit and she needs to consider it like a job that she comes in at the appointment time that is specified.  We are only doing this for her benefit to decrease her chance of local regional recurrence.   she has said she has had a lot of personal problems and that is why she has been not able to come in.  I told her that from now on she needs to not miss a single day and were going to have to probably calculate additional doses because of the amount of time that she has missed.  She said she understands.  She will call the machine to reschedule her appointments.  I gave her the #[id number].

## 2023-04-26 ENCOUNTER — HOSPITAL ENCOUNTER (OUTPATIENT)
Dept: RADIATION ONCOLOGY | Facility: MEDICAL CENTER | Age: 33
End: 2023-04-26
Payer: COMMERCIAL

## 2023-04-26 VITALS
OXYGEN SATURATION: 96 % | DIASTOLIC BLOOD PRESSURE: 74 MMHG | SYSTOLIC BLOOD PRESSURE: 106 MMHG | HEART RATE: 105 BPM | BODY MASS INDEX: 19.26 KG/M2 | WEIGHT: 123 LBS

## 2023-04-26 LAB
CHEMOTHERAPY INFUSION START DATE: NORMAL
CHEMOTHERAPY RECORDS: 1.8
CHEMOTHERAPY RECORDS: 1.8
CHEMOTHERAPY RECORDS: 2160
CHEMOTHERAPY RECORDS: 3780
CHEMOTHERAPY RECORDS: NORMAL
CHEMOTHERAPY RX CANCER: NORMAL
DATE 1ST CHEMO CANCER: NORMAL
RAD ONC ARIA COURSE LAST TREATMENT DATE: NORMAL
RAD ONC ARIA COURSE TREATMENT ELAPSED DAYS: NORMAL
RAD ONC ARIA REFERENCE POINT DOSAGE GIVEN TO DATE: 14.4
RAD ONC ARIA REFERENCE POINT DOSAGE GIVEN TO DATE: 14.4
RAD ONC ARIA REFERENCE POINT DOSAGE GIVEN TO DATE: 5.4
RAD ONC ARIA REFERENCE POINT DOSAGE GIVEN TO DATE: 5.4
RAD ONC ARIA REFERENCE POINT ID: NORMAL
RAD ONC ARIA REFERENCE POINT SESSION DOSAGE GIVEN: 1.8

## 2023-04-26 PROCEDURE — 77412 RADIATION TX DELIVERY LVL 3: CPT | Performed by: RADIOLOGY

## 2023-04-26 PROCEDURE — 77417 THER RADIOLOGY PORT IMAGE(S): CPT | Performed by: RADIOLOGY

## 2023-04-26 ASSESSMENT — PAIN SCALES - GENERAL: PAINLEVEL: NO PAIN

## 2023-04-26 NOTE — ON TREATMENT VISIT
ON TREATMENT NOTE  RADIATION ONCOLOGY DEPARTMENT    Patient name:  Bekah Ronquillo    Primary Physician:  Chandana Shen M.D. MRN: [id number]  CSN: [id number]   Referring physician:  Oskar Qureshi M* : 1990, 33 y.o.     ENCOUNTER DATE:  23    DIAGNOSIS:    Malignant neoplasm of upper-outer quadrant of right female breast (HCC)  Staging form: Breast, AJCC 8th Edition  - Pathologic stage from 11/15/2022: Stage IA (pT1, pN0(i+)(sn), cM0, G2, ER+, [place]+, HER2-) - Signed by Oskar Qureshi M.D. on 2022  Stage prefix: Initial diagnosis  Method of lymph node assessment: [place] lymph node biopsy  Histologic grading system: 3 grade system      TREATMENT SUMMARY:  Radiation Treatments       Active   Plans   R CW   Most recent treatment: Dose planned: 180 cGy (fraction 5 of 5 on 2023)   Total: Dose planned: 5,040 cGy   Elapsed Days: 9 @ [id number]      R CW Bolus   Most recent treatment: Dose planned: 180 cGy (fraction 2 of 14 on 2023)   Total: Dose planned: 2,520 cGy   Elapsed Days: 9 @ [id number]      R SCV   Most recent treatment: Dose planned: 180 cGy (fraction 7 of 28 on 2023)   Total: Dose planned: 5,040 cGy   Elapsed Days: 9 @ [id number]      Reference Points   R CW   Most recent treatment: Dose given: -- (on 2023)   Total: Dose given: 900 cGy   Elapsed Days: 9 @ [id number]      R CW Bolus   Most recent treatment: Dose given: -- (on 2023)   Total: Dose given: 360 cGy   Elapsed Days: 9 @ [id number]      R CW CP   Most recent treatment: Dose given: -- (on 2023)   Total: Dose given: 900 cGy   Elapsed Days: 9 @ [id number]      R SCV   Most recent treatment: Dose given: -- (on 2023)   Total: Dose given: 1,260 cGy   Elapsed Days: 9 @ [id number]      R SCV CP   Most recent treatment: Dose given: -- (on 2023)   Total: Dose given: 1,260 cGy   Elapsed Days: 9 @ [id number]      Rcw Bolus cp   Most recent treatment: Dose given: -- (on  4/12/2023)   Total: Dose given: 360 cGy   Elapsed Days: 9 @ [id number]                      SUBJECTIVE:   Patient has been noncompliant in her visits.  She has not been for radiation treatment in approximately 10 days.      VITAL SIGNS:  /74 (BP Location: Left arm, Patient Position: Sitting, BP Cuff Size: Adult)   Pulse (!) 105   Wt 55.8 kg (123 lb)   SpO2 96%    Encounter Vitals  Blood Pressure: 106/74  Pulse: (!) 105  Pulse Oximetry: 96 %  Weight: 55.8 kg (123 lb)  Pain Score: No pain      4/26/2023     1:58 PM 4/11/2023     3:05 PM 3/21/2023     8:15 AM 3/8/2023     3:47 PM 1/18/2023     2:51 PM 12/30/2022     3:06 PM   Pain Assessment   Pain Score NO PAIN NO PAIN NO PAIN NO PAIN 6=MODERATE P NO PAIN   Pain Loc     GENERALIZED           PHYSICAL EXAM:    No erythema        4/26/2023     2:01 PM 4/11/2023     3:05 PM   Toxicity Assessment   Toxicity Assessment Breast Breast   Fatigue (lethargy, malaise, asthenia) Increased fatigue over baseline, but not altering normal activities Increased fatigue over baseline, but not altering normal activities   Fever (in the absence of neutropenia) None None   Radiation Dermatitis None Faint erythema or dry desquamation   Lymphatics Normal Normal   RT - Pain due to RT None None   Dyspnea Normal Normal         IMPRESSION:  Cancer Staging   Malignant neoplasm of upper-outer quadrant of right female breast (HCC)  Staging form: Breast, AJCC 8th Edition  - Pathologic stage from 11/15/2022: Stage IA (pT1, pN0(i+)(sn), cM0, G2, ER+, [place]+, HER2-) - Signed by Oskar Qureshi M.D. on 12/30/2022      PLAN:  No change in treatment plan.  I explained to the patient that she needs to come in on a daily basis otherwise this is just causing damage and not benefiting her at all.  From now on she needs to come in every single day.  She does seem to understand this.  We also asked if she needed help with  and were happy to have the  person come in and  talk to her.  She said she has an under control and she will not miss any further days.    Disposition:  Treatment plan reviewed. Questions answered. Continue therapy outlined.     Pratibha Mendez M.D.    No orders of the defined types were placed in this encounter.

## 2023-04-27 ENCOUNTER — HOSPITAL ENCOUNTER (OUTPATIENT)
Dept: RADIATION ONCOLOGY | Facility: MEDICAL CENTER | Age: 33
End: 2023-04-27
Payer: COMMERCIAL

## 2023-04-27 LAB
CHEMOTHERAPY INFUSION START DATE: NORMAL
CHEMOTHERAPY INFUSION START DATE: NORMAL
CHEMOTHERAPY RECORDS: 1.8
CHEMOTHERAPY RECORDS: 1.8
CHEMOTHERAPY RECORDS: 2160
CHEMOTHERAPY RECORDS: 3780
CHEMOTHERAPY RECORDS: NORMAL
CHEMOTHERAPY RX CANCER: NORMAL
CHEMOTHERAPY RX CANCER: NORMAL
DATE 1ST CHEMO CANCER: NORMAL
DATE 1ST CHEMO CANCER: NORMAL
RAD ONC ARIA COURSE LAST TREATMENT DATE: NORMAL
RAD ONC ARIA COURSE LAST TREATMENT DATE: NORMAL
RAD ONC ARIA COURSE TREATMENT ELAPSED DAYS: NORMAL
RAD ONC ARIA COURSE TREATMENT ELAPSED DAYS: NORMAL
RAD ONC ARIA REFERENCE POINT DOSAGE GIVEN TO DATE: 16.2
RAD ONC ARIA REFERENCE POINT DOSAGE GIVEN TO DATE: 16.2
RAD ONC ARIA REFERENCE POINT DOSAGE GIVEN TO DATE: 7.2
RAD ONC ARIA REFERENCE POINT DOSAGE GIVEN TO DATE: 7.2
RAD ONC ARIA REFERENCE POINT ID: NORMAL
RAD ONC ARIA REFERENCE POINT SESSION DOSAGE GIVEN: 1.8

## 2023-04-27 PROCEDURE — 77412 RADIATION TX DELIVERY LVL 3: CPT | Performed by: RADIOLOGY

## 2023-04-28 ENCOUNTER — HOSPITAL ENCOUNTER (OUTPATIENT)
Dept: RADIATION ONCOLOGY | Facility: MEDICAL CENTER | Age: 33
End: 2023-04-28
Payer: COMMERCIAL

## 2023-04-28 LAB
CHEMOTHERAPY INFUSION START DATE: NORMAL
CHEMOTHERAPY RECORDS: 1.8
CHEMOTHERAPY RECORDS: 1.8
CHEMOTHERAPY RECORDS: 2160
CHEMOTHERAPY RECORDS: 3780
CHEMOTHERAPY RECORDS: NORMAL
CHEMOTHERAPY RX CANCER: NORMAL
DATE 1ST CHEMO CANCER: NORMAL
RAD ONC ARIA COURSE LAST TREATMENT DATE: NORMAL
RAD ONC ARIA COURSE TREATMENT ELAPSED DAYS: NORMAL
RAD ONC ARIA REFERENCE POINT DOSAGE GIVEN TO DATE: 18
RAD ONC ARIA REFERENCE POINT DOSAGE GIVEN TO DATE: 18
RAD ONC ARIA REFERENCE POINT DOSAGE GIVEN TO DATE: 9
RAD ONC ARIA REFERENCE POINT DOSAGE GIVEN TO DATE: 9
RAD ONC ARIA REFERENCE POINT ID: NORMAL
RAD ONC ARIA REFERENCE POINT SESSION DOSAGE GIVEN: 1.8

## 2023-04-28 PROCEDURE — 77412 RADIATION TX DELIVERY LVL 3: CPT | Performed by: RADIOLOGY

## 2023-04-28 PROCEDURE — 77427 RADIATION TX MANAGEMENT X5: CPT | Performed by: RADIOLOGY

## 2023-05-01 ENCOUNTER — HOSPITAL ENCOUNTER (OUTPATIENT)
Dept: RADIATION ONCOLOGY | Facility: MEDICAL CENTER | Age: 33
End: 2023-05-31
Attending: RADIOLOGY
Payer: COMMERCIAL

## 2023-05-01 ENCOUNTER — HOSPITAL ENCOUNTER (OUTPATIENT)
Dept: RADIATION ONCOLOGY | Facility: MEDICAL CENTER | Age: 33
End: 2023-05-01
Payer: COMMERCIAL

## 2023-05-01 LAB
CHEMOTHERAPY INFUSION START DATE: NORMAL
CHEMOTHERAPY RECORDS: 1.8
CHEMOTHERAPY RECORDS: 1.8
CHEMOTHERAPY RECORDS: 2160
CHEMOTHERAPY RECORDS: 3780
CHEMOTHERAPY RECORDS: NORMAL
CHEMOTHERAPY RX CANCER: NORMAL
DATE 1ST CHEMO CANCER: NORMAL
RAD ONC ARIA COURSE LAST TREATMENT DATE: NORMAL
RAD ONC ARIA COURSE TREATMENT ELAPSED DAYS: NORMAL
RAD ONC ARIA REFERENCE POINT DOSAGE GIVEN TO DATE: 10.8
RAD ONC ARIA REFERENCE POINT DOSAGE GIVEN TO DATE: 10.8
RAD ONC ARIA REFERENCE POINT DOSAGE GIVEN TO DATE: 19.8
RAD ONC ARIA REFERENCE POINT DOSAGE GIVEN TO DATE: 19.8
RAD ONC ARIA REFERENCE POINT ID: NORMAL
RAD ONC ARIA REFERENCE POINT SESSION DOSAGE GIVEN: 1.8

## 2023-05-01 PROCEDURE — 77417 THER RADIOLOGY PORT IMAGE(S): CPT | Performed by: RADIOLOGY

## 2023-05-01 PROCEDURE — 77412 RADIATION TX DELIVERY LVL 3: CPT | Performed by: RADIOLOGY

## 2023-05-02 ENCOUNTER — HOSPITAL ENCOUNTER (OUTPATIENT)
Dept: RADIATION ONCOLOGY | Facility: MEDICAL CENTER | Age: 33
End: 2023-05-02
Payer: COMMERCIAL

## 2023-05-02 LAB
CHEMOTHERAPY INFUSION START DATE: NORMAL
CHEMOTHERAPY RECORDS: 1.8
CHEMOTHERAPY RECORDS: 1.8
CHEMOTHERAPY RECORDS: 2160
CHEMOTHERAPY RECORDS: 3780
CHEMOTHERAPY RECORDS: NORMAL
CHEMOTHERAPY RX CANCER: NORMAL
DATE 1ST CHEMO CANCER: NORMAL
RAD ONC ARIA COURSE LAST TREATMENT DATE: NORMAL
RAD ONC ARIA COURSE TREATMENT ELAPSED DAYS: NORMAL
RAD ONC ARIA REFERENCE POINT DOSAGE GIVEN TO DATE: 12.6
RAD ONC ARIA REFERENCE POINT DOSAGE GIVEN TO DATE: 12.6
RAD ONC ARIA REFERENCE POINT DOSAGE GIVEN TO DATE: 21.6
RAD ONC ARIA REFERENCE POINT DOSAGE GIVEN TO DATE: 21.6
RAD ONC ARIA REFERENCE POINT ID: NORMAL
RAD ONC ARIA REFERENCE POINT SESSION DOSAGE GIVEN: 1.8

## 2023-05-02 PROCEDURE — 77412 RADIATION TX DELIVERY LVL 3: CPT | Performed by: RADIOLOGY

## 2023-05-02 PROCEDURE — 77336 RADIATION PHYSICS CONSULT: CPT | Performed by: RADIOLOGY

## 2023-05-03 ENCOUNTER — HOSPITAL ENCOUNTER (OUTPATIENT)
Dept: RADIATION ONCOLOGY | Facility: MEDICAL CENTER | Age: 33
End: 2023-05-03
Payer: COMMERCIAL

## 2023-05-03 VITALS
HEART RATE: 92 BPM | DIASTOLIC BLOOD PRESSURE: 66 MMHG | BODY MASS INDEX: 20.05 KG/M2 | OXYGEN SATURATION: 98 % | WEIGHT: 128 LBS | SYSTOLIC BLOOD PRESSURE: 105 MMHG

## 2023-05-03 LAB
CHEMOTHERAPY INFUSION START DATE: NORMAL
CHEMOTHERAPY RECORDS: 1.8
CHEMOTHERAPY RECORDS: 1.8
CHEMOTHERAPY RECORDS: 2160
CHEMOTHERAPY RECORDS: 3780
CHEMOTHERAPY RECORDS: NORMAL
CHEMOTHERAPY RX CANCER: NORMAL
DATE 1ST CHEMO CANCER: NORMAL
RAD ONC ARIA COURSE LAST TREATMENT DATE: NORMAL
RAD ONC ARIA COURSE TREATMENT ELAPSED DAYS: NORMAL
RAD ONC ARIA REFERENCE POINT DOSAGE GIVEN TO DATE: 14.4
RAD ONC ARIA REFERENCE POINT DOSAGE GIVEN TO DATE: 14.4
RAD ONC ARIA REFERENCE POINT DOSAGE GIVEN TO DATE: 23.4
RAD ONC ARIA REFERENCE POINT DOSAGE GIVEN TO DATE: 23.4
RAD ONC ARIA REFERENCE POINT ID: NORMAL
RAD ONC ARIA REFERENCE POINT SESSION DOSAGE GIVEN: 1.8

## 2023-05-03 PROCEDURE — 77412 RADIATION TX DELIVERY LVL 3: CPT | Performed by: RADIOLOGY

## 2023-05-03 ASSESSMENT — PAIN SCALES - GENERAL: PAINLEVEL: 3=SLIGHT PAIN

## 2023-05-03 NOTE — ON TREATMENT VISIT
ON TREATMENT NOTE  RADIATION ONCOLOGY DEPARTMENT    Patient name:  Bekah Ronquillo    Primary Physician:  Chandana Shen M.D. MRN: [id number]  CSN: [id number]   Referring physician:  Oskar Qureshi M* : 1990, 33 y.o.     ENCOUNTER DATE:  23    DIAGNOSIS:    Malignant neoplasm of upper-outer quadrant of right female breast (HCC)  Staging form: Breast, AJCC 8th Edition  - Pathologic stage from 11/15/2022: Stage IA (pT1, pN0(i+)(sn), cM0, G2, ER+, [place]+, HER2-) - Signed by Oskar Qureshi M.D. on 2022  Stage prefix: Initial diagnosis  Method of lymph node assessment: [place] lymph node biopsy  Histologic grading system: 3 grade system      TREATMENT SUMMARY:  Radiation Treatments       Active   Plans   R CW   Most recent treatment: Dose planned: 180 cGy (fraction 5 of 5 on 2023)   Total: Dose planned: 5,040 cGy   Elapsed Days: 9 @ [id number]      R CW Bolus   Most recent treatment: Dose planned: 180 cGy (fraction 2 of 14 on 2023)   Total: Dose planned: 2,520 cGy   Elapsed Days: 9 @ [id number]      R CW Bolus:1   Most recent treatment: Dose planned: 180 cGy (fraction 6 of 12 on 5/3/2023)   Total: Dose planned: 2,160 cGy   Elapsed Days: 30 @ [id number]      R SCV   Most recent treatment: Dose planned: 180 cGy (fraction 7 of 28 on 2023)   Total: Dose planned: 5,040 cGy   Elapsed Days: 9 @ [id number]      R SCV:1   Most recent treatment: Dose planned: 180 cGy (fraction 6 of 21 on 5/3/2023)   Total: Dose planned: 3,780 cGy   Elapsed Days: 30 @ [id number]      Reference Points   R CW   Most recent treatment: Dose given: -- (on 2023)   Total: Dose given: 900 cGy   Elapsed Days: 9 @ [id number]      R CW Bolus   Most recent treatment: Dose given: 180 cGy (on 5/3/2023)   Total: Dose given: 1,440 cGy   Elapsed Days: 30 @ [id number]      R CW CP   Most recent treatment: Dose given: -- (on 2023)   Total: Dose given: 900 cGy   Elapsed Days:  @ [id number]      R  SCV   Most recent treatment: Dose given: 180 cGy (on 5/3/2023)   Total: Dose given: 2,340 cGy   Elapsed Days: 30 @ [id number]      R SCV CP   Most recent treatment: Dose given: 180 cGy (on 5/3/2023)   Total: Dose given: 2,340 cGy   Elapsed Days: 30 @ [id number]      Rcw Bolus cp   Most recent treatment: Dose given: 180 cGy (on 5/3/2023)   Total: Dose given: 1,440 cGy   Elapsed Days: 30 @ [id number]                      SUBJECTIVE:     Tolerating therapy well.  Notices little tightness in the treatment field      VITAL SIGNS:  /66 (BP Location: Left arm, Patient Position: Sitting, BP Cuff Size: Adult)   Pulse 92   Wt 58.1 kg (128 lb)   SpO2 98%    Encounter Vitals  Blood Pressure: 105/66  Pulse: 92  Pulse Oximetry: 98 %  Weight: 58.1 kg (128 lb)  Pain Score: 3=Slight Pain      5/3/2023     1:44 PM 4/26/2023     1:58 PM 4/11/2023     3:05 PM 3/21/2023     8:15 AM 3/8/2023     3:47 PM 1/18/2023     2:51 PM   Pain Assessment   Pain Score 3=SLIGHT RAJANI NO PAIN NO PAIN NO PAIN NO PAIN 6=MODERATE P   Pain Loc BREAST     GENERALIZED          PHYSICAL EXAM:    Mild erythema in the treatment field        5/3/2023     1:49 PM 4/26/2023     2:01 PM 4/11/2023     3:05 PM   Toxicity Assessment   Toxicity Assessment Breast Breast Breast   Fatigue (lethargy, malaise, asthenia) Moderate (e.g., decrease in performance status by 1 ECOG level or 20% Karnofsky) or causing difficulty performing some activities Increased fatigue over baseline, but not altering normal activities Increased fatigue over baseline, but not altering normal activities   Fever (in the absence of neutropenia) None None None   Radiation Dermatitis None None Faint erythema or dry desquamation   Lymphatics Normal Normal Normal   RT - Pain due to RT Mild pain not interfering with function None None   Dyspnea Normal Normal Normal         IMPRESSION:  Cancer Staging   Malignant neoplasm of upper-outer quadrant of right female breast (HCC)  Staging form:  Breast, AJCC 8th Edition  - Pathologic stage from 11/15/2022: Stage IA (pT1, pN0(i+)(sn), cM0, G2, ER+, [place]+, HER2-) - Signed by Oskar Qureshi M.D. on 12/30/2022      PLAN:  No change in treatment plan    Disposition:  Treatment plan reviewed. Questions answered. Continue therapy outlined.     Pratibha Mendez M.D.    No orders of the defined types were placed in this encounter.

## 2023-05-04 ENCOUNTER — HOSPITAL ENCOUNTER (OUTPATIENT)
Dept: RADIATION ONCOLOGY | Facility: MEDICAL CENTER | Age: 33
End: 2023-05-04
Payer: COMMERCIAL

## 2023-05-04 LAB
CHEMOTHERAPY INFUSION START DATE: NORMAL
CHEMOTHERAPY RECORDS: 1.8
CHEMOTHERAPY RECORDS: 1.8
CHEMOTHERAPY RECORDS: 2160
CHEMOTHERAPY RECORDS: 3780
CHEMOTHERAPY RECORDS: NORMAL
CHEMOTHERAPY RX CANCER: NORMAL
DATE 1ST CHEMO CANCER: NORMAL
RAD ONC ARIA COURSE LAST TREATMENT DATE: NORMAL
RAD ONC ARIA COURSE TREATMENT ELAPSED DAYS: NORMAL
RAD ONC ARIA REFERENCE POINT DOSAGE GIVEN TO DATE: 16.2
RAD ONC ARIA REFERENCE POINT DOSAGE GIVEN TO DATE: 16.2
RAD ONC ARIA REFERENCE POINT DOSAGE GIVEN TO DATE: 25.2
RAD ONC ARIA REFERENCE POINT DOSAGE GIVEN TO DATE: 25.2
RAD ONC ARIA REFERENCE POINT ID: NORMAL
RAD ONC ARIA REFERENCE POINT SESSION DOSAGE GIVEN: 1.8

## 2023-05-04 PROCEDURE — 77412 RADIATION TX DELIVERY LVL 3: CPT | Performed by: RADIOLOGY

## 2023-05-05 ENCOUNTER — HOSPITAL ENCOUNTER (OUTPATIENT)
Dept: RADIATION ONCOLOGY | Facility: MEDICAL CENTER | Age: 33
End: 2023-05-05

## 2023-05-05 LAB
CHEMOTHERAPY INFUSION START DATE: NORMAL
CHEMOTHERAPY RECORDS: 1.8
CHEMOTHERAPY RECORDS: 1.8
CHEMOTHERAPY RECORDS: 2160
CHEMOTHERAPY RECORDS: 3780
CHEMOTHERAPY RECORDS: NORMAL
CHEMOTHERAPY RX CANCER: NORMAL
DATE 1ST CHEMO CANCER: NORMAL
RAD ONC ARIA COURSE LAST TREATMENT DATE: NORMAL
RAD ONC ARIA COURSE TREATMENT ELAPSED DAYS: NORMAL
RAD ONC ARIA REFERENCE POINT DOSAGE GIVEN TO DATE: 18
RAD ONC ARIA REFERENCE POINT DOSAGE GIVEN TO DATE: 18
RAD ONC ARIA REFERENCE POINT DOSAGE GIVEN TO DATE: 27
RAD ONC ARIA REFERENCE POINT DOSAGE GIVEN TO DATE: 27
RAD ONC ARIA REFERENCE POINT ID: NORMAL
RAD ONC ARIA REFERENCE POINT SESSION DOSAGE GIVEN: 1.8

## 2023-05-05 PROCEDURE — 77412 RADIATION TX DELIVERY LVL 3: CPT | Performed by: RADIOLOGY

## 2023-05-05 PROCEDURE — 77427 RADIATION TX MANAGEMENT X5: CPT | Performed by: RADIOLOGY

## 2023-05-08 ENCOUNTER — HOSPITAL ENCOUNTER (OUTPATIENT)
Dept: RADIATION ONCOLOGY | Facility: MEDICAL CENTER | Age: 33
End: 2023-05-08
Payer: COMMERCIAL

## 2023-05-08 LAB
CHEMOTHERAPY INFUSION START DATE: NORMAL
CHEMOTHERAPY RECORDS: 1.8
CHEMOTHERAPY RECORDS: 1.8
CHEMOTHERAPY RECORDS: 2160
CHEMOTHERAPY RECORDS: 3780
CHEMOTHERAPY RECORDS: NORMAL
CHEMOTHERAPY RX CANCER: NORMAL
DATE 1ST CHEMO CANCER: NORMAL
RAD ONC ARIA COURSE LAST TREATMENT DATE: NORMAL
RAD ONC ARIA COURSE TREATMENT ELAPSED DAYS: NORMAL
RAD ONC ARIA REFERENCE POINT DOSAGE GIVEN TO DATE: 19.8
RAD ONC ARIA REFERENCE POINT DOSAGE GIVEN TO DATE: 19.8
RAD ONC ARIA REFERENCE POINT DOSAGE GIVEN TO DATE: 28.8
RAD ONC ARIA REFERENCE POINT DOSAGE GIVEN TO DATE: 28.8
RAD ONC ARIA REFERENCE POINT ID: NORMAL
RAD ONC ARIA REFERENCE POINT SESSION DOSAGE GIVEN: 1.8

## 2023-05-08 PROCEDURE — 77417 THER RADIOLOGY PORT IMAGE(S): CPT | Performed by: RADIOLOGY

## 2023-05-08 PROCEDURE — 77412 RADIATION TX DELIVERY LVL 3: CPT | Performed by: RADIOLOGY

## 2023-05-09 ENCOUNTER — HOSPITAL ENCOUNTER (OUTPATIENT)
Dept: RADIATION ONCOLOGY | Facility: MEDICAL CENTER | Age: 33
End: 2023-05-09
Payer: COMMERCIAL

## 2023-05-09 LAB
CHEMOTHERAPY INFUSION START DATE: NORMAL
CHEMOTHERAPY RECORDS: 1.8
CHEMOTHERAPY RECORDS: 1.8
CHEMOTHERAPY RECORDS: 2160
CHEMOTHERAPY RECORDS: 3780
CHEMOTHERAPY RECORDS: NORMAL
CHEMOTHERAPY RX CANCER: NORMAL
DATE 1ST CHEMO CANCER: NORMAL
RAD ONC ARIA COURSE LAST TREATMENT DATE: NORMAL
RAD ONC ARIA COURSE TREATMENT ELAPSED DAYS: NORMAL
RAD ONC ARIA REFERENCE POINT DOSAGE GIVEN TO DATE: 21.6
RAD ONC ARIA REFERENCE POINT DOSAGE GIVEN TO DATE: 21.6
RAD ONC ARIA REFERENCE POINT DOSAGE GIVEN TO DATE: 30.6
RAD ONC ARIA REFERENCE POINT DOSAGE GIVEN TO DATE: 30.6
RAD ONC ARIA REFERENCE POINT ID: NORMAL
RAD ONC ARIA REFERENCE POINT SESSION DOSAGE GIVEN: 1.8

## 2023-05-09 PROCEDURE — 77412 RADIATION TX DELIVERY LVL 3: CPT | Performed by: RADIOLOGY

## 2023-05-09 PROCEDURE — 77336 RADIATION PHYSICS CONSULT: CPT | Performed by: RADIOLOGY

## 2023-05-10 ENCOUNTER — HOSPITAL ENCOUNTER (OUTPATIENT)
Dept: RADIATION ONCOLOGY | Facility: MEDICAL CENTER | Age: 33
End: 2023-05-10
Payer: COMMERCIAL

## 2023-05-10 VITALS
WEIGHT: 127 LBS | OXYGEN SATURATION: 97 % | SYSTOLIC BLOOD PRESSURE: 117 MMHG | DIASTOLIC BLOOD PRESSURE: 79 MMHG | BODY MASS INDEX: 19.89 KG/M2 | HEART RATE: 85 BPM

## 2023-05-10 LAB
CHEMOTHERAPY INFUSION START DATE: NORMAL
CHEMOTHERAPY RECORDS: 1.8
CHEMOTHERAPY RECORDS: 1.8
CHEMOTHERAPY RECORDS: 2160
CHEMOTHERAPY RECORDS: 3780
CHEMOTHERAPY RECORDS: NORMAL
CHEMOTHERAPY RX CANCER: NORMAL
DATE 1ST CHEMO CANCER: NORMAL
RAD ONC ARIA COURSE LAST TREATMENT DATE: NORMAL
RAD ONC ARIA COURSE TREATMENT ELAPSED DAYS: NORMAL
RAD ONC ARIA REFERENCE POINT DOSAGE GIVEN TO DATE: 23.4
RAD ONC ARIA REFERENCE POINT DOSAGE GIVEN TO DATE: 23.4
RAD ONC ARIA REFERENCE POINT DOSAGE GIVEN TO DATE: 32.4
RAD ONC ARIA REFERENCE POINT DOSAGE GIVEN TO DATE: 32.4
RAD ONC ARIA REFERENCE POINT ID: NORMAL
RAD ONC ARIA REFERENCE POINT SESSION DOSAGE GIVEN: 1.8

## 2023-05-10 PROCEDURE — 77412 RADIATION TX DELIVERY LVL 3: CPT | Performed by: RADIOLOGY

## 2023-05-10 NOTE — ON TREATMENT VISIT
ON TREATMENT NOTE  RADIATION ONCOLOGY DEPARTMENT    Patient name:  Bekah Ronquillo    Primary Physician:  Chandana Shen M.D. MRN: [id number]  CSN: [id number]   Referring physician:  Oskar Qureshi M* : 1990, 33 y.o.     ENCOUNTER DATE:  05/10/23    DIAGNOSIS:    Malignant neoplasm of upper-outer quadrant of right female breast (HCC)  Staging form: Breast, AJCC 8th Edition  - Pathologic stage from 11/15/2022: Stage IA (pT1, pN0(i+)(sn), cM0, G2, ER+, [place]+, HER2-) - Signed by Oskar Qureshi M.D. on 2022  Stage prefix: Initial diagnosis  Method of lymph node assessment: [place] lymph node biopsy  Histologic grading system: 3 grade system      TREATMENT SUMMARY:  Radiation Treatments       Active   Plans   R CW   Most recent treatment: Dose planned: 180 cGy (fraction 5 of 5 on 2023)   Total: Dose planned: 5,040 cGy   Elapsed Days: 9 @ [id number]      R CW Bolus   Most recent treatment: Dose planned: 180 cGy (fraction 2 of 14 on 2023)   Total: Dose planned: 2,520 cGy   Elapsed Days: 9 @ [id number]      R CW Bolus:1   Most recent treatment: Dose planned: 180 cGy (fraction 11 of 12 on 5/10/2023)   Total: Dose planned: 2,160 cGy   Elapsed Days: 37 @ [id number]      R SCV   Most recent treatment: Dose planned: 180 cGy (fraction 7 of 28 on 2023)   Total: Dose planned: 5,040 cGy   Elapsed Days: 9 @ [id number]      R SCV:1   Most recent treatment: Dose planned: 180 cGy (fraction 11 of 21 on 5/10/2023)   Total: Dose planned: 3,780 cGy   Elapsed Days: 37 @ [id number]      Reference Points   R CW   Most recent treatment: Dose given: -- (on 2023)   Total: Dose given: 900 cGy   Elapsed Days: 9 @ [id number]      R CW Bolus   Most recent treatment: Dose given: 180 cGy (on 5/10/2023)   Total: Dose given: 2,340 cGy   Elapsed Days: 37 @ [id number]      R CW CP   Most recent treatment: Dose given: -- (on 2023)   Total: Dose given: 900 cGy   Elapsed Days: 9 @ [id number]       R SCV   Most recent treatment: Dose given: 180 cGy (on 5/10/2023)   Total: Dose given: 3,240 cGy   Elapsed Days: 37 @ [id number]      R SCV CP   Most recent treatment: Dose given: 180 cGy (on 5/10/2023)   Total: Dose given: 3,240 cGy   Elapsed Days: 37 @ [id number]      Rcw Bolus cp   Most recent treatment: Dose given: 180 cGy (on 5/10/2023)   Total: Dose given: 2,340 cGy   Elapsed Days: 37 @ [id number]                      SUBJECTIVE:     Soreness  in the treatment field and quite a bit of fatigue      VITAL SIGNS:  /79 (BP Location: Left arm, Patient Position: Sitting, BP Cuff Size: Adult)   Pulse 85   Wt 57.6 kg (127 lb)   SpO2 97%    Encounter Vitals  Blood Pressure: 117/79  Pulse: 85  Pulse Oximetry: 97 %  Weight: 57.6 kg (127 lb)  Pain Score:  (Discomfort)      5/3/2023     1:44 PM 4/26/2023     1:58 PM 4/11/2023     3:05 PM 3/21/2023     8:15 AM 3/8/2023     3:47 PM   Pain Assessment   Pain Score 3=SLIGHT RAJANI NO PAIN NO PAIN NO PAIN NO PAIN   Pain Loc BREAST              PHYSICAL EXAM:    Moderate erythema in the treatment field        5/10/2023    11:55 AM 5/3/2023     1:49 PM 4/26/2023     2:01 PM 4/11/2023     3:05 PM   Toxicity Assessment   Toxicity Assessment Breast Breast Breast Breast   Fatigue (lethargy, malaise, asthenia) Moderate (e.g., decrease in performance status by 1 ECOG level or 20% Karnofsky) or causing difficulty performing some activities Moderate (e.g., decrease in performance status by 1 ECOG level or 20% Karnofsky) or causing difficulty performing some activities Increased fatigue over baseline, but not altering normal activities Increased fatigue over baseline, but not altering normal activities   Fever (in the absence of neutropenia) None None None None   Radiation Dermatitis Moderate to brisk erythema or a patchy moist desquamation, mostly confined to skin folds and creases, with/without moderate edema None None Faint erythema or dry desquamation   Lymphatics  Normal Normal Normal Normal   RT - Pain due to RT None Mild pain not interfering with function None None   Dyspnea Normal Normal Normal Normal         IMPRESSION:  Cancer Staging   Malignant neoplasm of upper-outer quadrant of right female breast (HCC)  Staging form: Breast, AJCC 8th Edition  - Pathologic stage from 11/15/2022: Stage IA (pT1, pN0(i+)(sn), cM0, G2, ER+, [place]+, HER2-) - Signed by Oskar Qureshi M.D. on 12/30/2022      PLAN:  No change in treatment plan.  She has been given Chalino Gel on that helps.  I also explained to her that we may have to do a couple additional treatments we are looking into it and physics..    Disposition:  Treatment plan reviewed. Questions answered. Continue therapy outlined.     Pratibha Mendez M.D.    No orders of the defined types were placed in this encounter.

## 2023-05-11 ENCOUNTER — HOSPITAL ENCOUNTER (OUTPATIENT)
Dept: RADIATION ONCOLOGY | Facility: MEDICAL CENTER | Age: 33
End: 2023-05-11
Payer: COMMERCIAL

## 2023-05-11 LAB
CHEMOTHERAPY INFUSION START DATE: NORMAL
CHEMOTHERAPY RECORDS: 1.8
CHEMOTHERAPY RECORDS: 1.8
CHEMOTHERAPY RECORDS: 2160
CHEMOTHERAPY RECORDS: 3780
CHEMOTHERAPY RECORDS: NORMAL
CHEMOTHERAPY RX CANCER: NORMAL
DATE 1ST CHEMO CANCER: NORMAL
RAD ONC ARIA COURSE LAST TREATMENT DATE: NORMAL
RAD ONC ARIA COURSE TREATMENT ELAPSED DAYS: NORMAL
RAD ONC ARIA REFERENCE POINT DOSAGE GIVEN TO DATE: 25.2
RAD ONC ARIA REFERENCE POINT DOSAGE GIVEN TO DATE: 25.2
RAD ONC ARIA REFERENCE POINT DOSAGE GIVEN TO DATE: 34.2
RAD ONC ARIA REFERENCE POINT DOSAGE GIVEN TO DATE: 34.2
RAD ONC ARIA REFERENCE POINT ID: NORMAL
RAD ONC ARIA REFERENCE POINT SESSION DOSAGE GIVEN: 1.8

## 2023-05-11 PROCEDURE — 77412 RADIATION TX DELIVERY LVL 3: CPT | Performed by: RADIOLOGY

## 2023-05-12 ENCOUNTER — HOSPITAL ENCOUNTER (OUTPATIENT)
Dept: RADIATION ONCOLOGY | Facility: MEDICAL CENTER | Age: 33
End: 2023-05-12
Payer: COMMERCIAL

## 2023-05-12 LAB
CHEMOTHERAPY INFUSION START DATE: NORMAL
CHEMOTHERAPY RECORDS: 1.8
CHEMOTHERAPY RECORDS: 1.8
CHEMOTHERAPY RECORDS: 1620
CHEMOTHERAPY RECORDS: 3780
CHEMOTHERAPY RECORDS: NORMAL
CHEMOTHERAPY RX CANCER: NORMAL
DATE 1ST CHEMO CANCER: NORMAL
RAD ONC ARIA COURSE LAST TREATMENT DATE: NORMAL
RAD ONC ARIA COURSE TREATMENT ELAPSED DAYS: NORMAL
RAD ONC ARIA REFERENCE POINT DOSAGE GIVEN TO DATE: 10.8
RAD ONC ARIA REFERENCE POINT DOSAGE GIVEN TO DATE: 10.8
RAD ONC ARIA REFERENCE POINT DOSAGE GIVEN TO DATE: 36
RAD ONC ARIA REFERENCE POINT DOSAGE GIVEN TO DATE: 36
RAD ONC ARIA REFERENCE POINT ID: NORMAL
RAD ONC ARIA REFERENCE POINT SESSION DOSAGE GIVEN: 1.8

## 2023-05-12 PROCEDURE — 77427 RADIATION TX MANAGEMENT X5: CPT | Performed by: RADIOLOGY

## 2023-05-12 PROCEDURE — 77412 RADIATION TX DELIVERY LVL 3: CPT | Performed by: RADIOLOGY

## 2023-05-12 PROCEDURE — 77417 THER RADIOLOGY PORT IMAGE(S): CPT | Performed by: RADIOLOGY

## 2023-05-16 ENCOUNTER — PATIENT OUTREACH (OUTPATIENT)
Dept: OTHER | Facility: MEDICAL CENTER | Age: 33
End: 2023-05-16
Payer: MEDICAID
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
## 2023-05-17 ENCOUNTER — TELEPHONE (OUTPATIENT)
Dept: HEMATOLOGY ONCOLOGY | Facility: MEDICAL CENTER | Age: 33
End: 2023-05-17

## 2023-05-23 ENCOUNTER — DOCUMENTATION (OUTPATIENT)
Dept: RADIATION ONCOLOGY | Facility: MEDICAL CENTER | Age: 33
End: 2023-05-23
Payer: MEDICAID

## 2023-05-23 LAB
CHEMOTHERAPY INFUSION START DATE: NORMAL
CHEMOTHERAPY INFUSION STOP DATE: NORMAL
CHEMOTHERAPY RECORDS: 1.8
CHEMOTHERAPY RECORDS: 1620
CHEMOTHERAPY RECORDS: 2160
CHEMOTHERAPY RECORDS: 2520
CHEMOTHERAPY RECORDS: 3780
CHEMOTHERAPY RECORDS: 5040
CHEMOTHERAPY RECORDS: 5040
CHEMOTHERAPY RECORDS: NORMAL
CHEMOTHERAPY RX CANCER: NORMAL
DATE 1ST CHEMO CANCER: NORMAL
RAD ONC ARIA COURSE LAST TREATMENT DATE: NORMAL
RAD ONC ARIA COURSE TREATMENT ELAPSED DAYS: NORMAL
RAD ONC ARIA REFERENCE POINT DOSAGE GIVEN TO DATE: 10.8
RAD ONC ARIA REFERENCE POINT DOSAGE GIVEN TO DATE: 10.8
RAD ONC ARIA REFERENCE POINT DOSAGE GIVEN TO DATE: 25.2
RAD ONC ARIA REFERENCE POINT DOSAGE GIVEN TO DATE: 25.2
RAD ONC ARIA REFERENCE POINT DOSAGE GIVEN TO DATE: 36
RAD ONC ARIA REFERENCE POINT DOSAGE GIVEN TO DATE: 36
RAD ONC ARIA REFERENCE POINT ID: NORMAL
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
## 2023-05-24 ENCOUNTER — HOSPITAL ENCOUNTER (OUTPATIENT)
Dept: RADIATION ONCOLOGY | Facility: MEDICAL CENTER | Age: 33
End: 2023-05-24
Payer: COMMERCIAL

## 2023-05-24 NOTE — RADIATION COMPLETION NOTES
END OF TREATMENT SUMMARY    Patient name:  Bekah Ronquillo    Primary Physician:  Chandana Shen M.D. MRN: [id number]  CSN: [id number]   Referring physician:  Dr. Oskar Qureshi.   : 1990, 33 y.o.       TREATMENT SUMMARY:        Course First Treatment Date 2023    Course Last Treatment Date 2023   Course Elapsed Days 39 @ [id number]   Course Intent Curative     Radiation Therapy Episodes       Active Episodes       Radiation Therapy: 3D CRT                   Radiation Treatments         Plan Last Treated On Elapsed Days Fractions Treated Prescribed Fraction Dose (cGy) Prescribed Total Dose (cGy)    R CW Bolus:1 2023 38 @ [id number]  180 2,160    R CW:1 2023 39 @ [id number]  180 1,620    R SCV:1 2023 39 @ [id number]  180 3,780                  Reference Point Last Treated On Elapsed Days Most Recent Session Dose (cGy) Total Dose (cGy)    R CW 2023 39 @ [id number] 180 1,080    R CW Bolus 2023 38 @ [id number] 180 2,520    R CW CP 2023 39 @ [id number] 180 1,080    R SCV 2023 39 @ [id number] 180 3,600    R SCV CP 2023 39 @ [id number] 180 3,600    Rcw Bolus cp 2023 38 @ [id number] 180 2,520                      Historical Treatments (Plans: 3)      Plan Last Treated On Elapsed Days Fractions Treated Prescribed Fraction Dose (cGy) Prescribed Total Dose (cGy)   R CW 2023 9 @ [id number]  180 5,040   R CW Bolus 2023 9 @ [id number]  180 2,520   R SCV 2023 9 @ [id number]  180 5,040                                     STAGE:   Malignant neoplasm of upper-outer quadrant of right female breast (HCC)  Staging form: Breast, AJCC 8th Edition  - Pathologic stage from 11/15/2022: Stage IA (pT1, pN0(i+)(sn), cM0, G2, ER+, OH+, HER2-) - Signed by Oskar Qureshi M.D. on 2022  Stage prefix: Initial diagnosis  Method of lymph node assessment: [place] lymph node biopsy  Histologic  grading system: 3 grade system       TREATMENT INDICATION:   Local control     CONCURRENT SYSTEMIC TREATMENT:   No     RT COURSE DISCONTINUED EARLY:   No     PATIENT EXPERIENCE:       5/10/2023    11:55 AM 5/3/2023     1:49 PM 4/26/2023     2:01 PM 4/11/2023     3:05 PM   Toxicity Assessment   Toxicity Assessment Breast Breast Breast Breast   Fatigue (lethargy, malaise, asthenia) Moderate (e.g., decrease in performance status by 1 ECOG level or 20% [place]sky) or causing difficulty performing some activities Moderate (e.g., decrease in performance status by 1 ECOG level or 20% [place]sky) or causing difficulty performing some activities Increased fatigue over baseline, but not altering normal activities Increased fatigue over baseline, but not altering normal activities   Fever (in the absence of neutropenia) None None None None   Radiation Dermatitis Moderate to brisk erythema or a patchy moist desquamation, mostly confined to skin folds and creases, with/without moderate edema None None Faint erythema or dry desquamation   Lymphatics Normal Normal Normal Normal   RT - Pain due to RT None Mild pain not interfering with function None None   Dyspnea Normal Normal Normal Normal        FOLLOW-UP PLAN:   6 Weeks     COMMENT: patient was noncompliant with her treatments.  She missed several days and ultimately decided because she had a skin reaction that she would discontinue therapy early.  She told us that she had had enough and was not going to have any further even if it was advised to complete the treatments.          ANATOMIC TARGET SUMMARY    ANATOMIC TARGET MODALITY TECHNIQUE   L_chest wall/scv   External beam, photons 3D Conformal            COMMENT:         DIAGRAMS:      DOSE VOLUME HISTOGRAMS:

## 2023-06-20 ENCOUNTER — APPOINTMENT (OUTPATIENT)
Dept: RADIATION ONCOLOGY | Facility: MEDICAL CENTER | Age: 33
End: 2023-06-20
Attending: RADIOLOGY
Payer: COMMERCIAL

## 2023-07-11 ENCOUNTER — HOSPITAL ENCOUNTER (OUTPATIENT)
Dept: RADIOLOGY | Facility: MEDICAL CENTER | Age: 33
End: 2023-07-11
Attending: INTERNAL MEDICINE
Payer: COMMERCIAL

## 2023-07-11 DIAGNOSIS — Z17.0 MALIGNANT NEOPLASM OF UPPER-OUTER QUADRANT OF RIGHT BREAST IN FEMALE, ESTROGEN RECEPTOR POSITIVE (HCC): ICD-10-CM

## 2023-07-11 DIAGNOSIS — C50.411 MALIGNANT NEOPLASM OF UPPER-OUTER QUADRANT OF RIGHT BREAST IN FEMALE, ESTROGEN RECEPTOR POSITIVE (HCC): ICD-10-CM

## 2023-07-11 PROCEDURE — A9552 F18 FDG: HCPCS

## 2024-01-10 ENCOUNTER — APPOINTMENT (OUTPATIENT)
Dept: HEMATOLOGY ONCOLOGY | Facility: MEDICAL CENTER | Age: 34
End: 2024-01-10
Payer: COMMERCIAL

## 2024-01-16 ENCOUNTER — TELEPHONE (OUTPATIENT)
Dept: HEMATOLOGY ONCOLOGY | Facility: MEDICAL CENTER | Age: 34
End: 2024-01-16
Payer: COMMERCIAL

## 2024-01-17 NOTE — TELEPHONE ENCOUNTER
M for patient to return call at Medical Oncology. Patient was a no-show for her 09:00 appointment today with Dr. Qureshi. I called to see if everything was okay and if the patient would like to reschedule. Office telephone number was provided for the patient to return my phone call and reschedule if she would like.

## 2024-02-28 ENCOUNTER — HOSPITAL ENCOUNTER (OUTPATIENT)
Dept: HEMATOLOGY ONCOLOGY | Facility: MEDICAL CENTER | Age: 34
End: 2024-02-28
Attending: INTERNAL MEDICINE
Payer: COMMERCIAL

## 2024-02-28 VITALS
DIASTOLIC BLOOD PRESSURE: 62 MMHG | OXYGEN SATURATION: 97 % | RESPIRATION RATE: 14 BRPM | WEIGHT: 127.54 LBS | BODY MASS INDEX: 20.02 KG/M2 | TEMPERATURE: 98 F | HEIGHT: 67 IN | SYSTOLIC BLOOD PRESSURE: 90 MMHG | HEART RATE: 85 BPM

## 2024-02-28 DIAGNOSIS — C50.411 MALIGNANT NEOPLASM OF UPPER-OUTER QUADRANT OF RIGHT BREAST IN FEMALE, ESTROGEN RECEPTOR POSITIVE (HCC): ICD-10-CM

## 2024-02-28 DIAGNOSIS — Z17.0 MALIGNANT NEOPLASM OF UPPER-OUTER QUADRANT OF RIGHT BREAST IN FEMALE, ESTROGEN RECEPTOR POSITIVE (HCC): ICD-10-CM

## 2024-02-28 PROCEDURE — 99212 OFFICE O/P EST SF 10 MIN: CPT | Performed by: INTERNAL MEDICINE

## 2024-02-28 PROCEDURE — 99214 OFFICE O/P EST MOD 30 MIN: CPT | Performed by: INTERNAL MEDICINE

## 2024-02-28 RX ORDER — RIMEGEPANT SULFATE 75 MG/75MG
TABLET, ORALLY DISINTEGRATING ORAL
COMMUNITY

## 2024-02-28 ASSESSMENT — ENCOUNTER SYMPTOMS
PSYCHIATRIC NEGATIVE: 1
EYES NEGATIVE: 1
CARDIOVASCULAR NEGATIVE: 1
NEUROLOGICAL NEGATIVE: 1
RESPIRATORY NEGATIVE: 1
GASTROINTESTINAL NEGATIVE: 1
MUSCULOSKELETAL NEGATIVE: 1
CONSTITUTIONAL NEGATIVE: 1

## 2024-02-28 ASSESSMENT — PAIN SCALES - GENERAL: PAINLEVEL: 3=SLIGHT PAIN

## 2024-02-28 NOTE — ADDENDUM NOTE
Encounter addended by: Franky Rao, Med Ass't on: 2/28/2024 2:41 PM   Actions taken: Charge Capture section accepted

## 2024-02-28 NOTE — PROGRESS NOTES
Medical oncology follow-up visit: 2/28/2024      Referring Physician: Cruz Wisdom M.D.  Primary Care:  Cruz Wisdom M.D.    Diagnosis: Right breast cancer with neuroendocrine differentiation    Chief Complaint:  Right breast cancer with neuroendocrine differentiation    History of Presenting Illness:  Bekah Ronquillo is a 32 y.o. female who had an abnormal mammogram in June 2022.  She underwent biopsies of the right breast at multiple locations on 7/6/2022 in [place].  Pathology was reviewed in [facility] and showed invasive ductal carcinoma intermediate grade in 3 distinct lesions at 11:00 3 4 and 8 cm from the nipple.  They were all ER positive greater than 90% MA low +5 to 12% and HER2 negative 1+ by IHC.  GATA3 was positive as well.  She was seen at [facility] and elected to undergo bilateral mastectomies.  It should be noted that her sister was diagnosed at the same age that she was with advanced breast cancer.  At surgery Bekah  was found to have 4 foci of invasive carcinoma of which 2 were read as invasive neuroendocrine tumors with the largest at 14 mm and 12 mm.  2 additional lesions were read as invasive ductal carcinoma with focal neuroendocrine differentiation.  All 4 foci were diffusely positive for synaptic ficin and all were negative for chromogranin.  Ki-67 ranged from 15.8% to 42.7%.1 of 3 sentinel lymph nodes showed isolated tumor cells measuring 0.12 mm in greatest dimension without extranodal extension.  The left breast showed focal proliferative fibrocystic change and no evidence of malignancy.  She had immediate tissue expander reconstruction.  She was seen by Dr. De La Cruz medical oncology who presented her case at [facility] breast conference.  The consensus was treatment with chemotherapy with AC/T followed by ovarian function suppression and an AI.  From [place] and elected to come back to get her treatment.  She is currently in the midst of fertility treatments including  fletrozole with plan for embryo harvest in early January.  Of note she has a longstanding history of ulcerative colitis which has been fairly symptomatic recently.  She is using anti-inflammatory oral and topical agents only and has not been on disease modifying agents like Humira in the past.  She does have chronic pain and will be seeing the pain specialist for this.  She is premenopausal.  Multigene panel testing was negative hereditary cancer syndromes except for a VUS in BRCA2.    Interval history 1/18/2023: She completed embryo retrieval and was able to get 5 embryos that are successfully harvested and frozen.  She is feeling well after going through the treatments.  After long discussion she has decided not to pursue chemotherapy.  She is going to see radiation therapy next week.  She is willing to begin endocrine therapy and ovarian function suppression.  We discussed the results indicating safety for discontinuing endocrine therapy temporarily after at least 2 years of treatment to get pregnant and then resuming subsequently.  We will likely follow this plan.    Interval history 3/8/2023: She had 1 shot of Lupron and promptly developed severe hot flashes acne and mood changes.  She does not wish to continue with ovarian function suppression.  She is willing to take tamoxifen.  It should be noted that her sister who has breast cancer is apparently stage IV in remission on tamoxifen.  She is going to see Dr. Mendez for radiation consideration in the near future as well.    Interval history 2/28/2024: She had saw Dr. Mendez and underwent radiation therapy over a 5-week period-week period completing in May 2023.  She has not started tamoxifen.  She has had reconstruction surgeries and has 1 more planned with her plastic surgeon who is now in [place] to do a fat graft in the spring.  She has no hot flashes or night sweats and her menstrual cycles are regular.  She did have a PET CT scan on July 11, 2023 that  showed no evidence of recurrence.  No new medical issues over the past year.      Past Medical History:   Diagnosis Date    Acquired hallux valgus of left foot 02/25/2020    Formatting of this note might be different from the original. Added automatically from request for surgery [id number]    Acquired hallux valgus of right foot 09/11/2019    Formatting of this note might be different from the original. Added automatically from request for surgery [id number]    Allergic rhinitis 02/19/2019    Anxiety disorder 12/28/2017    Cervical high risk human papillomavirus (HPV) DNA test positive 05/03/2017    Cervical intraepithelial neoplasia grade 1 05/03/2017    Formatting of this note might be different from the original. 04/2016 pap lgsil, hpv positive at [place]. Records in care everywhere. Scheduled for colposcopy later this month of 05/2017 8/21/2017 colposcopy done. No biopsies taken since pregnant. No abnormalities noted.  12/2017: ASCUS, HPV+ 5/17/2018 CIN1 6/2019-+ HPV, to schedule colpo 9/27/2019 colpo negative    Chronic pain 04/02/2021    Chronic vaginitis 01/17/2018    Deviated nasal septum 02/14/2019    Deviated nasal septum 02/14/2019    Insomnia 04/02/2021    Iron deficiency anemia 12/29/2017    Major depressive disorder, recurrent episode, in full remission (HCC) 02/14/2019    Malignant neoplasm of upper-outer quadrant of right female breast (HCC)     Migraines     Nasal septal perforation 02/19/2019    Recurrent UTI 02/08/2018       Past Surgical History:   Procedure Laterality Date    AXILLARY NODE DISSECTION Right     BUNIONECTOMY Bilateral     COLONOSCOPY      MASTECTOMY      TISSUE EXPANDER PLACE/REMOVE Bilateral        Social History     Tobacco Use    Smoking status: Never    Smokeless tobacco: Never   Vaping Use    Vaping Use: Never used   Substance Use Topics    Alcohol use: Never    Drug use: Never        Family History   Problem Relation Age of Onset    Heart Disease Mother     Hypertension Father      Hyperlipidemia Father     Diabetes Father     Stroke Father     Dementia Father     Breast Cancer Sister     Cancer Sister     Cancer Paternal Aunt         Cervical    Cancer Paternal Grandfather         Lung       Allergies as of 02/28/2024    (No Known Allergies)         Current Outpatient Medications:     NURTEC 75 MG TABLET DISPERSIBLE, DISSOLVE 1 TABLET ON THE TONGUE EVERY OTHER DAY FOR MIGRAINE PREVENTION, Disp: , Rfl:     ADDERALL XR, 20MG, 20 MG per XR capsule, TAKE UP TO 2 CAPSULES BY MOUTH EVERY MORNING FOR ADHD, Disp: , Rfl:     Paragard Intrauterine Copper IUD, 1 Intra Uterine Device by Intrauterine route., Disp: , Rfl:     pregabalin (LYRICA) 50 MG capsule, Take 50 mg by mouth 2 times a day., Disp: , Rfl:     ondansetron (ZOFRAN ODT) 8 MG TABLET DISPERSIBLE, Take 1 Tablet by mouth every 8 hours as needed for Nausea., Disp: 15 Tablet, Rfl: 3    HYDROcodone-acetaminophen (NORCO) 5-325 MG Tab per tablet, Take 1 Tablet by mouth 3 times a day., Disp: , Rfl:     Coenzyme Q10 (CO Q-10) 100 MG Cap, Take 1 Capsule by mouth 3 times a day. (Patient not taking: Reported on 2/28/2024), Disp: , Rfl:     magnesium oxide (MAG-OX) 400 MG Tab tablet, Take 400 mg by mouth every day. (Patient not taking: Reported on 2/28/2024), Disp: , Rfl:     Riboflavin (B-2) 100 MG Tab, Take 2 Tablets by mouth 2 times a day. (Patient not taking: Reported on 2/28/2024), Disp: , Rfl:     rizatriptan (MAXALT-MLT) 10 MG disintegrating tablet, DISSOLVE 1 TABLET ON THE TONGUE AS NEEDED AT ONSET OF MIGRAINE HEADACHE. MAY REPEAT EVERY 2 HOURS FOR UP TO 3 TABLETS IN 24 HOURS IF NO RELIEF (Patient not taking: Reported on 2/28/2024), Disp: , Rfl:     sumatriptan (IMITREX) 100 MG tablet, Take 100 mg by mouth as needed. (Patient not taking: Reported on 2/28/2024), Disp: , Rfl:     tamoxifen (NOLVADEX) 20 MG tablet, Take 1 Tablet by mouth every day., Disp: 60 Tablet, Rfl: 3    letrozole (FEMARA) 2.5 MG Tab, Take 5 mg by mouth every day. (Patient not  taking: Reported on 2/28/2024), Disp: , Rfl:     aspirin (ASA) 81 MG Chew Tab chewable tablet, Chew 81 mg every day. (Patient not taking: Reported on 2/28/2024), Disp: , Rfl:     Follitropin [place] (GONAL-F RFF PEN SC), Inject 225 mg under the skin. AM (Patient not taking: Reported on 2/28/2024), Disp: , Rfl:     Menotropins (MENOPUR) 75 UNIT Recon Soln, Inject  under the skin. (Patient not taking: Reported on 2/28/2024), Disp: , Rfl:     Mesalamine (LIALDA PO), Take  by mouth. (Patient not taking: Reported on 2/28/2024), Disp: , Rfl:     Review of Systems:  Review of Systems   Constitutional: Negative.    HENT: Negative.     Eyes: Negative.    Respiratory: Negative.     Cardiovascular: Negative.    Gastrointestinal: Negative.    Genitourinary: Negative.    Musculoskeletal: Negative.    Skin: Negative.    Neurological: Negative.    Endo/Heme/Allergies: Negative.    Psychiatric/Behavioral: Negative.            Physical Exam:      DESC; KARNOFSKY SCALE WITH ECOG EQUIVALENT: 80, Normal activity with effort; some signs or symptoms of disease (ECOG equivalent 1)    DISTRESS LEVEL: moderate distress    Physical Exam     Exam not performed  Labs:  No visits with results within 1 Month(s) from this visit.   Latest known visit with results is:   No results found for any previous visit.       Imaging:   All listed images below have been independently reviewed by me. I agree with the findings as summarized below:    No results found.     Pathology:      Assessment & Plan:    1.  Multifocal invasive ductal carcinoma with neuroendocrine differentiation largest lesion 14 mm, all grade 2 stage I (pT1c, pT1c, pT1b, pT1a, PN0 (I+)) ER positive greater than 90%, ID low +5 to 20%, HER2 negative, Ki-67 ranging from 15 to 45%.   Declined chemotherapy and major toxicity from ovarian function suppression wishing to discontinue.  Has not yet initiated tamoxifen although indicates that she is willing to in the future   2.  Ulcerative colitis  symptomatic improved  3.  Strong family history of breast cancer with personal negative multigene panel ( VUS BRCA2 by report)  4.  Severe menopausal symptoms from Lupron discontinued after 1 cycle.    Plan: After long discussion with the patient she is agreeable to get another PET scan in July and then consider starting tamoxifen afterwards.  We will see her back at that time.  Any questions and concerns raised by the patient were answered to the best of my ability. Thank you for allowing me to participate in the care for this patient. Please feel free to contact me for any questions or concerns.     Oskar Qureshi M.D.

## 2024-03-13 ENCOUNTER — APPOINTMENT (OUTPATIENT)
Dept: RADIOLOGY | Facility: MEDICAL CENTER | Age: 34
End: 2024-03-13
Attending: INTERNAL MEDICINE
Payer: COMMERCIAL

## 2024-03-19 ENCOUNTER — APPOINTMENT (OUTPATIENT)
Dept: HEMATOLOGY ONCOLOGY | Facility: MEDICAL CENTER | Age: 34
End: 2024-03-19
Payer: COMMERCIAL